# Patient Record
Sex: FEMALE | Race: WHITE | NOT HISPANIC OR LATINO | Employment: FULL TIME | URBAN - METROPOLITAN AREA
[De-identification: names, ages, dates, MRNs, and addresses within clinical notes are randomized per-mention and may not be internally consistent; named-entity substitution may affect disease eponyms.]

---

## 2018-09-20 ENCOUNTER — OFFICE VISIT (OUTPATIENT)
Dept: PSYCHIATRY | Facility: CLINIC | Age: 33
End: 2018-09-20
Attending: PSYCHIATRY & NEUROLOGY
Payer: COMMERCIAL

## 2018-09-20 VITALS
HEIGHT: 62 IN | HEART RATE: 88 BPM | SYSTOLIC BLOOD PRESSURE: 138 MMHG | BODY MASS INDEX: 27.57 KG/M2 | WEIGHT: 149.81 LBS | RESPIRATION RATE: 19 BRPM | DIASTOLIC BLOOD PRESSURE: 84 MMHG

## 2018-09-20 DIAGNOSIS — F90.0 ATTENTION DEFICIT HYPERACTIVITY DISORDER (ADHD), PREDOMINANTLY INATTENTIVE TYPE: ICD-10-CM

## 2018-09-20 DIAGNOSIS — F41.1 GAD (GENERALIZED ANXIETY DISORDER): ICD-10-CM

## 2018-09-20 DIAGNOSIS — F42.2 MIXED OBSESSIONAL THOUGHTS AND ACTS: ICD-10-CM

## 2018-09-20 DIAGNOSIS — F33.42 RECURRENT MAJOR DEPRESSIVE DISORDER, IN FULL REMISSION: Primary | ICD-10-CM

## 2018-09-20 PROCEDURE — 99999 PR PBB SHADOW E&M-NEW PATIENT-LVL III: CPT | Mod: PBBFAC,,, | Performed by: PSYCHIATRY & NEUROLOGY

## 2018-09-20 PROCEDURE — 90792 PSYCH DIAG EVAL W/MED SRVCS: CPT | Mod: S$GLB,,, | Performed by: PSYCHIATRY & NEUROLOGY

## 2018-09-20 RX ORDER — CLONAZEPAM 0.5 MG/1
0.5 TABLET ORAL DAILY
Refills: 5 | COMMUNITY
Start: 2018-08-27 | End: 2018-12-17 | Stop reason: SDUPTHER

## 2018-09-20 RX ORDER — PHENTERMINE HYDROCHLORIDE 37.5 MG/1
37.5 TABLET ORAL
COMMUNITY
End: 2018-09-20 | Stop reason: SDUPTHER

## 2018-09-20 RX ORDER — HYDROCHLOROTHIAZIDE 12.5 MG/1
12.5 CAPSULE ORAL DAILY
Refills: 8 | COMMUNITY
Start: 2018-08-27

## 2018-09-20 RX ORDER — FLUOXETINE HYDROCHLORIDE 20 MG/1
60 CAPSULE ORAL DAILY
Refills: 5 | COMMUNITY
Start: 2018-09-10 | End: 2018-12-17 | Stop reason: SDUPTHER

## 2018-09-20 RX ORDER — PHENTERMINE HYDROCHLORIDE 37.5 MG/1
37.5 TABLET ORAL
Qty: 15 TABLET | Refills: 0 | Status: SHIPPED | OUTPATIENT
Start: 2018-09-20 | End: 2018-10-18

## 2018-09-20 NOTE — PROGRESS NOTES
"Outpatient Psychiatry Initial Visit (MD/NP)    9/20/2018    Johann Hendrix, a 32 y.o. female, presenting for initial evaluation visit. Met with patient.    Reason for Encounter: self-referral. Patient complains of No chief complaint on file.  .    History of Present Illness:     She first got psychiatric care from Sara Price five years ago for OCD anxiety and depression.  She was put on the keto diet by Dr. Price about a year ago.  She has lost over 50 pounds since starting the diet, she is lessening up on the diet.    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  Her  works as a     Denies Symptoms of Depression: diminished mood or loss of interest/anhedonia; irritability, diminished energy, change in sleep, change in appetite, diminished concentration or cognition or indecisiveness, PMA/R, excessive guilt or hopelessness or worthlessness, suicidal ideations    She has had previous episodes of depression.  She has suicidal thoughts at those times.  She has not had a depressive episode since she got on her keto diet.      Denies Changes in Sleep: trouble with initiation, maintenance, early morning awakening with inability to return to sleep, hypersomnolence     She takes 50mg of benadryl with her clonazepam for sleep.      Denies Suicidal/Homicidal ideations: active/passive ideations, organized plans, future intentions    Possible history of Symptoms of burke or hypomania: elevated, expansive, or irritable mood with increased energy or activity; with inflated self-esteem or grandiosity, decreased need for sleep, increased rate of speech, FOI or racing thoughts, distractibility, increased goal " directed activity or PMA, risky/disinhibited behavior    Five years ago she had an episode where, on and off for a year, she would have decreased sleep, racing thoughts, a lot of drama.  She would go a few days without sleep.  Patient explains that she had been initially diagnosed with bipolar disorder, but Dr. Price eventually thought that it was OCD and anxiety / depression.  That's why she stopped lithium etc.  She now believes it was a severe depression.      Endorses Symptoms of AMELIA: +excessive anxiety/worry/fear, +more days than not, +about numerous issues, +difficult to control, +with restlessness, fatigue, poor concentration, +irritability, +muscle tension, +sleep disturbance; +causes functionally impairing distress     Symptoms of Panic Disorder: recurrent panic attacks (palpitations/heart racing, sweating, shakiness, dyspnea, choking, chest pain/discomfort, Gi symptoms, dizzy/lightheadedness, hot/col flashes, paresthesias, derealization, fear of losing control or fear of dying), precipitated or un-precipitated, source of worry and/or behavioral changes secondary, with or without agoraphobia    ADHD Screen, denies hyperactivity  Trouble paying close attention to details, or careless mistakes: reports  difficulty sustaining attention/remaining focused: reports  Absent minded/wandeing thoughts during conversation: admits to  Doesn't follow through on instructions, starts tasks but does not finish or easily distracted: does not  Difficulty with organizing: denies  Avoids/dislikes tasks that require sustained attention: denies  Looses important things: admits to  Easily distracted by extraneous stimuli: admits to  Forgetful in daily activities: admits to    No history of a heart problem, she has high blood pressure but is treated with HCTZ.  No family history of heart attack at a young age.      Some Symptoms of Social Anxiety Disorder: excessive fear/anxiety regarding social situations with fear of being  negatively evaluated, avoidaant behavior, functionally impairing distress    Denies Symptoms of PTSD: h/o trauma; re-experiencing/intrusive symptoms, avoidant behavior, negative alterations in cognition or mood, hyperarousal symptoms; with or without dissociative symptoms     Resolved Symptoms of OCD: obsessions (recurrent thoughts/urges/images; intrusive and/or unwanted; uses other thoughts/actions to suppress); compulsions (repetitive behaviors used to lower distress/anxiety/obsessions)     After hearing about someone killing a child a couple years ago, she had really worried about whether she could hurt her children.  She has gotten therapy for this and she has been able to let it go.      Denies Substance Use: intoxication, withdrawal, tolerance, used in larger amounts or duration than intended, unsuccessful attempts to limit or quit, increased time engaging in or seeking out, cravings or strong desire to use, failure to fulfill obligations, negative consequences in social/interpersonal/occupational,/recreational areas, use in dangerous situations, medical or psychological consequences       Review Of Systems:     GENERAL:  No weight gain or loss  SKIN:  No rashes or lacerations  HEAD:  No headaches  EYES:  No exophthalmos, jaundice or blindness  EARS:  No dizziness, tinnitus or hearing loss  NOSE:  No changes in smell  MOUTH & THROAT:  No dyskinetic movements or obvious goiter  CHEST:  No shortness of breath, hyperventilation or cough  CARDIOVASCULAR:  No tachycardia or chest pain  ABDOMEN:  No nausea, vomiting, pain, constipation or diarrhea  URINARY:  No frequency, dysuria or sexual dysfunction  ENDOCRINE:  No polydipsia, polyuria  MUSCULOSKELETAL:  No pain or stiffness of the joints  NEUROLOGIC:  No weakness, sensory changes, seizures, confusion, memory loss, tremor or other abnormal movements    Current Evaluation:     Nutritional Screening: Considering the patient's height and weight, medications, medical  "history and preferences, should a referral be made to the dietitian? no    Constitutional  Vitals:  Most recent vital signs, dated less than 90 days prior to this appointment, were reviewed.    Vitals:    09/20/18 1222   BP: 138/84   Pulse: 88   Resp: 19   Weight: 67.9 kg (149 lb 12.8 oz)   Height: 5' 2" (1.575 m)        General:  unremarkable, age appropriate     Musculoskeletal  Muscle Strength/Tone:  no spasicity, no rigidity   Gait & Station:  non-ataxic     Psychiatric  Speech:  no latency; no press   Mood & Affect:  happy, euthymic  congruent and appropriate   Thought Process:  normal and logical   Associations:  intact   Thought Content:  normal, no suicidality, no homicidality, delusions, or paranoia   Insight:  intact   Judgement: behavior is adequate to circumstances   Orientation:  grossly intact   Memory: intact for content of interview   Language: grossly intact   Attention Span & Concentration:  able to focus   Fund of Knowledge:  intact and appropriate to age and level of education       Relevant Elements of Neurological Exam: normal gait    Functioning in Relationships:  Spouse/partner: good  Peers: somewhat isolated  Employers: n/a    Laboratory Data  No visits with results within 1 Month(s) from this visit.   Latest known visit with results is:   Admission on 06/08/2017, Discharged on 06/08/2017   Component Date Value Ref Range Status    POC Preg Test, Ur 06/08/2017 Negative  Negative Final     Acceptable 06/08/2017 Yes   Final         Medications  Outpatient Encounter Medications as of 9/20/2018   Medication Sig Dispense Refill    clonazePAM (KLONOPIN) 0.5 MG tablet Take 0.5 mg by mouth once daily.  5    FLUoxetine (PROZAC) 20 MG capsule Take 60 mg by mouth once daily.  5    hydroCHLOROthiazide (MICROZIDE) 12.5 mg capsule Take 12.5 mg by mouth once daily.  8    phentermine (ADIPEX-P) 37.5 mg tablet Take 1 tablet (37.5 mg total) by mouth before breakfast. TAKE ONE-HALF TABLET " MY MOUTH EVERY MORNING. 15 tablet 0    [DISCONTINUED] phentermine (ADIPEX-P) 37.5 mg tablet Take 37.5 mg by mouth before breakfast. TAKE ONE-HALF TABLET MY MOUTH EVERY MORNING.      [DISCONTINUED] acebutolol (SECTRAL) 400 mg capsule Take 400 mg by mouth 2 (two) times daily.      [DISCONTINUED] clonazePAM (KLONOPIN) 1 MG tablet Take 1 mg by mouth 2 (two) times daily as needed for Anxiety.      [DISCONTINUED] diphenhydrAMINE (SOMINEX) 25 mg tablet Take 25 mg by mouth nightly as needed for Insomnia.      [DISCONTINUED] fluoxetine (PROZAC) 40 MG capsule Take 40 mg by mouth once daily.      [DISCONTINUED] ibuprofen (ADVIL,MOTRIN) 800 MG tablet Take 1 tablet (800 mg total) by mouth every 6 (six) hours as needed for Pain. 20 tablet 0    [DISCONTINUED] lithium (ESKALITH) 300 MG capsule Take 300 mg by mouth 3 (three) times daily with meals.      [DISCONTINUED] loratadine (CLARITIN) 10 mg tablet Take 10 mg by mouth once daily.       No facility-administered encounter medications on file as of 9/20/2018.            Assessment - Diagnosis - Goals:     Impression:       ICD-10-CM ICD-9-CM   1. Recurrent major depressive disorder, in full remission F33.42 296.36   2. Mixed obsessional thoughts and acts F42.2 300.3   3. Attention deficit hyperactivity disorder (ADHD), predominantly inattentive type F90.0 314.00   4. AMELIA (generalized anxiety disorder) F41.1 300.02       Strengths and Liabilities: Strength: Patient accepts guidance/feedback, Strength: Patient has reasonable judgment., Strength: Patient is stable.    Treatment Goals:  Specify outcomes written in observable, behavioral terms:   continued maintenance of conditions    Treatment Plan/Recommendations:     Depression  Continue Prozac 60mg QDay    AMELIA  Prozac  Clonazepam 0.5mg QHS    OCD  Prozac    Weight Loss  Phentermine 37.5mg 1/2 tablet QBreakfast    Return to Clinic: 1 month    Counseling time: 10  Total time: 45  Consulting clinician was informed of the  encounter and consult note.

## 2018-10-18 ENCOUNTER — OFFICE VISIT (OUTPATIENT)
Dept: PSYCHIATRY | Facility: CLINIC | Age: 33
End: 2018-10-18
Attending: PSYCHIATRY & NEUROLOGY
Payer: COMMERCIAL

## 2018-10-18 VITALS
DIASTOLIC BLOOD PRESSURE: 68 MMHG | HEIGHT: 62 IN | WEIGHT: 150.38 LBS | BODY MASS INDEX: 27.67 KG/M2 | HEART RATE: 82 BPM | SYSTOLIC BLOOD PRESSURE: 116 MMHG | RESPIRATION RATE: 18 BRPM

## 2018-10-18 DIAGNOSIS — F33.42 RECURRENT MAJOR DEPRESSIVE DISORDER, IN FULL REMISSION: Primary | ICD-10-CM

## 2018-10-18 DIAGNOSIS — F41.1 GAD (GENERALIZED ANXIETY DISORDER): ICD-10-CM

## 2018-10-18 DIAGNOSIS — F42.2 MIXED OBSESSIONAL THOUGHTS AND ACTS: ICD-10-CM

## 2018-10-18 DIAGNOSIS — F90.0 ATTENTION DEFICIT HYPERACTIVITY DISORDER (ADHD), PREDOMINANTLY INATTENTIVE TYPE: ICD-10-CM

## 2018-10-18 PROCEDURE — 99999 PR PBB SHADOW E&M-EST. PATIENT-LVL III: CPT | Mod: PBBFAC,,, | Performed by: PSYCHIATRY & NEUROLOGY

## 2018-10-18 PROCEDURE — 3008F BODY MASS INDEX DOCD: CPT | Mod: CPTII,S$GLB,, | Performed by: PSYCHIATRY & NEUROLOGY

## 2018-10-18 PROCEDURE — 99213 OFFICE O/P EST LOW 20 MIN: CPT | Mod: S$GLB,,, | Performed by: PSYCHIATRY & NEUROLOGY

## 2018-10-18 RX ORDER — BUPROPION HYDROCHLORIDE 150 MG/1
150 TABLET ORAL DAILY
Qty: 30 TABLET | Refills: 0 | Status: SHIPPED | OUTPATIENT
Start: 2018-10-18 | End: 2018-11-13 | Stop reason: SDUPTHER

## 2018-10-18 RX ORDER — PHENTERMINE HYDROCHLORIDE 37.5 MG/1
37.5 TABLET ORAL
Qty: 8 TABLET | Refills: 0 | Status: SHIPPED | OUTPATIENT
Start: 2018-10-18 | End: 2018-10-26

## 2018-10-18 RX ORDER — IBUPROFEN 800 MG/1
800 TABLET ORAL 3 TIMES DAILY PRN
Refills: 0 | COMMUNITY
Start: 2018-10-01

## 2018-10-18 NOTE — PROGRESS NOTES
"Outpatient Psychiatry Follow-Up Visit (MD/NP)    10/18/2018    Clinical Status of Patient:  Outpatient (Ambulatory)    Chief Complaint:  Johann Hendrix is a 32 y.o. female who presents today for follow-up of depression, anxiety and attention problems.  Met with patient.      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  She has suicidal thoughts at those times.  She has not had a depressive episode since she got on her keto diet.       Denies Changes in Sleep: trouble with initiation, maintenance, early morning awakening with inability to return to sleep, hypersomnolence      She takes 50mg of benadryl with her clonazepam for sleep.       Denies Suicidal/Homicidal ideations: active/passive ideations, organized plans, future intentions     Possible history of Symptoms of burke or hypomania: elevated, expansive, or irritable mood with increased energy or activity; with inflated self-esteem or grandiosity, decreased need for sleep, increased rate of speech, FOI or racing thoughts, distractibility, increased goal directed activity or PMA, risky/disinhibited behavior     Five years ago she had an episode where, on and off for a year, she " "would have decreased sleep, racing thoughts, a lot of drama.  She would go a few days without sleep.  Patient explains that she had been initially diagnosed with bipolar disorder, but Dr. Price eventually thought that it was OCD and anxiety / depression.  That's why she stopped lithium etc.  She now believes it was a severe depression.      Patient with no OCD symptoms      Worsening Symptoms of AMELIA: +excessive anxiety/worry/fear, +more days than not, +about numerous issues, +difficult to control, +with restlessness, fatigue, poor concentration, +irritability, +muscle tension, +sleep disturbance; +causes functionally impairing distress     Patient feels a need to "just keep going".  This started when she started taking pheteramine.  She has lost a lot of weight and does not want to regain weight.      ADHD Screen, denies hyperactivity  Trouble paying close attention to details, or careless mistakes: reports  difficulty sustaining attention/remaining focused: reports  Absent minded/wandeing thoughts during conversation: admits to  Doesn't follow through on instructions, starts tasks but does not finish or easily distracted: does not  Difficulty with organizing: denies  Avoids/dislikes tasks that require sustained attention: denies  Looses important things: admits to  Easily distracted by extraneous stimuli: admits to  Forgetful in daily activities: admits to     No history of a heart problem, she has high blood pressure but is treated with HCTZ.  No family history of heart attack at a young age    Some Symptoms of Social Anxiety Disorder: excessive fear/anxiety regarding social situations with fear of being negatively evaluated, avoidaant behavior, functionally impairing distress    Psychotherapy:  · Target symptoms: anxiety   · Why chosen therapy is appropriate versus another modality: relevant to diagnosis  · Outcome monitoring methods: self-report, observation  · Therapeutic intervention type: behavior modifying " "psychotherapy, supportive psychotherapy  · Topics discussed/themes: building skills sets for symptom management, symptom recognition  · The patient's response to the intervention is accepting. The patient's progress toward treatment goals is good.   · Duration of intervention: 5 minutes.    Review of Systems   · PSYCHIATRIC: Pertinant items are noted in the narrative.  · CONSTITUTIONAL: some weight gain recently, problems with teeth   · MUSCULOSKELETAL: No pain or stiffness of the joints.  · NEUROLOGIC: No weakness, sensory changes, seizures, confusion, memory loss, tremor or other abnormal movements.  · ENDOCRINE: No polydipsia or polyuria.  · RESPIRATORY: No shortness of breath.  · CARDIOVASCULAR: No tachycardia or chest pain.  · GASTROINTESTINAL: No nausea, vomiting, pain, constipation or diarrhea.  · GENITOURINARY: No frequency, dysuria or sexual dysfunction.    Past Medical, Family and Social History: The patient's past medical, family and social history have been reviewed and updated as appropriate within the electronic medical record - see encounter notes.    Compliance: yes    Side effects: see above    Risk Parameters:  Patient reports no suicidal ideation  Patient reports no homicidal ideation  Patient reports no self-injurious behavior  Patient reports no violent behavior    Exam (detailed: at least 9 elements; comprehensive: all 15 elements)   Constitutional  Vitals:  Most recent vital signs, dated less than 90 days prior to this appointment, were reviewed.   Vitals:    10/18/18 1226   BP: 116/68   Pulse: 82   Resp: 18   Weight: 68.2 kg (150 lb 5.7 oz)   Height: 5' 2" (1.575 m)        General:  unremarkable, age appropriate     Musculoskeletal  Muscle Strength/Tone:  no spasicity, no rigidity   Gait & Station:  non-ataxic     Psychiatric  Speech:  no latency; no press   Mood & Affect:  anxious  congruent and appropriate   Thought Process:  normal and logical   Associations:  intact   Thought Content:  " normal, no suicidality, no homicidality, delusions, or paranoia   Insight:  intact   Judgement: behavior is adequate to circumstances   Orientation:  grossly intact   Memory: intact for content of interview   Language: grossly intact   Attention Span & Concentration:  distracted   Fund of Knowledge:  intact and appropriate to age and level of education     Assessment and Diagnosis   Status/Progress: Based on the examination today, the patient's problem(s) is/are worsening.  New problems have not been presented today.   Co-morbidities are not complicating management of the primary condition.  There are no active rule-out diagnoses for this patient at this time.     General Impression:       ICD-10-CM ICD-9-CM   1. Recurrent major depressive disorder, in full remission F33.42 296.36   2. Mixed obsessional thoughts and acts F42.2 300.3   3. Attention deficit hyperactivity disorder (ADHD), predominantly inattentive type F90.0 314.00   4. AMELIA (generalized anxiety disorder) F41.1 300.02       Intervention/Counseling/Treatment Plan     Depression  Continue Prozac 60mg QDay     AMELIA  Prozac  Clonazepam 0.5mg QHS     OCD  Prozac     Weight Loss  Titrate off of phenteramine and start Wellbutrin XL 150mg QDay with plan to increase dose    Discussed diagnosis, risks and benefits of proposed treatment vs alternative treatments vs no treatment, and potential side effects of these treatments. The patient expresses understanding of the above and displays the capacity to agree with this treatment given said understanding. Patient also agrees that, currently, the benefits outweigh the risks and would like to pursue treatment at this time.  Checked LA  and no irregularities were noted.        Return to Clinic: 1 month   65

## 2018-10-18 NOTE — PATIENT INSTRUCTIONS
Please try to reduce your pheteramine to 1/3-1/4 tablet per day while starting wellbutrin.  If you are not very active for a day, don't take your phenteramine.

## 2018-11-13 RX ORDER — BUPROPION HYDROCHLORIDE 150 MG/1
150 TABLET ORAL DAILY
Qty: 30 TABLET | Refills: 0 | Status: SHIPPED | OUTPATIENT
Start: 2018-11-13 | End: 2018-12-17

## 2018-12-17 ENCOUNTER — OFFICE VISIT (OUTPATIENT)
Dept: PSYCHIATRY | Facility: CLINIC | Age: 33
End: 2018-12-17
Attending: PSYCHIATRY & NEUROLOGY
Payer: COMMERCIAL

## 2018-12-17 VITALS
RESPIRATION RATE: 16 BRPM | DIASTOLIC BLOOD PRESSURE: 76 MMHG | SYSTOLIC BLOOD PRESSURE: 122 MMHG | WEIGHT: 152.56 LBS | HEIGHT: 62 IN | HEART RATE: 68 BPM | BODY MASS INDEX: 28.07 KG/M2

## 2018-12-17 DIAGNOSIS — F90.0 ATTENTION DEFICIT HYPERACTIVITY DISORDER (ADHD), PREDOMINANTLY INATTENTIVE TYPE: ICD-10-CM

## 2018-12-17 DIAGNOSIS — F41.1 GAD (GENERALIZED ANXIETY DISORDER): ICD-10-CM

## 2018-12-17 DIAGNOSIS — F33.42 RECURRENT MAJOR DEPRESSIVE DISORDER, IN FULL REMISSION: Primary | ICD-10-CM

## 2018-12-17 DIAGNOSIS — F42.2 MIXED OBSESSIONAL THOUGHTS AND ACTS: ICD-10-CM

## 2018-12-17 PROCEDURE — 90833 PSYTX W PT W E/M 30 MIN: CPT | Mod: S$GLB,,, | Performed by: PSYCHIATRY & NEUROLOGY

## 2018-12-17 PROCEDURE — 99999 PR PBB SHADOW E&M-EST. PATIENT-LVL III: CPT | Mod: PBBFAC,,, | Performed by: PSYCHIATRY & NEUROLOGY

## 2018-12-17 PROCEDURE — 99213 OFFICE O/P EST LOW 20 MIN: CPT | Mod: S$GLB,,, | Performed by: PSYCHIATRY & NEUROLOGY

## 2018-12-17 PROCEDURE — 3008F BODY MASS INDEX DOCD: CPT | Mod: CPTII,S$GLB,, | Performed by: PSYCHIATRY & NEUROLOGY

## 2018-12-17 RX ORDER — DEXTROAMPHETAMINE SACCHARATE, AMPHETAMINE ASPARTATE, DEXTROAMPHETAMINE SULFATE AND AMPHETAMINE SULFATE 2.5; 2.5; 2.5; 2.5 MG/1; MG/1; MG/1; MG/1
TABLET ORAL
Qty: 30 TABLET | Refills: 0 | Status: SHIPPED | OUTPATIENT
Start: 2018-12-17 | End: 2019-01-17 | Stop reason: SDUPTHER

## 2018-12-17 RX ORDER — CLONAZEPAM 0.5 MG/1
0.5 TABLET ORAL DAILY
Qty: 30 TABLET | Refills: 0 | Status: SHIPPED | OUTPATIENT
Start: 2018-12-17 | End: 2019-01-17

## 2018-12-17 RX ORDER — FLUOXETINE HYDROCHLORIDE 40 MG/1
80 CAPSULE ORAL DAILY
Qty: 60 CAPSULE | Refills: 0 | Status: SHIPPED | OUTPATIENT
Start: 2018-12-17 | End: 2019-01-17 | Stop reason: SDUPTHER

## 2018-12-17 NOTE — PROGRESS NOTES
"Outpatient Psychiatry Follow-Up Visit (MD/NP)    12/17/2018    Clinical Status of Patient:  Outpatient (Ambulatory)    Chief Complaint:  Johann Hendrix is a 33 y.o. female who presents today for follow-up of depression, anxiety and attention problems.  Met with patient.      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  Her  works as a     12/17/18  She feels like the wellbutrin is not effective.  She feels groggy as if it is not working.  She doesn't have motivation like she used to on phenteramine.  We discussed possibly trying higher dose of wellbutrin or low dose adderall.  She is concerned that stimulants worsen her anxiety.  She is much less anxious without phenteramine.      Denies Symptoms of Depression: diminished mood or loss of interest/anhedonia; irritability, diminished energy, change in sleep, change in appetite, diminished concentration or cognition or indecisiveness, PMA/R, excessive guilt or hopelessness or worthlessness, suicidal ideations     She has had previous episodes of depression.  She has suicidal thoughts at those times.  She has not had a depressive episode since she got on her keto diet.       Denies Changes in Sleep: trouble with initiation, maintenance, early morning awakening with inability to return to sleep, hypersomnolence      She takes 50mg of benadryl with her clonazepam for sleep.       Denies Suicidal/Homicidal ideations: active/passive ideations, organized plans, future intentions     Possible history of Symptoms of burke or hypomania: elevated, expansive, or irritable " mood with increased energy or activity; with inflated self-esteem or grandiosity, decreased need for sleep, increased rate of speech, FOI or racing thoughts, distractibility, increased goal directed activity or PMA, risky/disinhibited behavior     Five years ago she had an episode where, on and off for a year, she would have decreased sleep, racing thoughts, a lot of drama.  She would go a few days without sleep.  Patient explains that she had been initially diagnosed with bipolar disorder, but Dr. Price eventually thought that it was OCD and anxiety / depression.  That's why she stopped lithium etc.  She now believes it was a severe depression.      Patient with no OCD symptoms      Somewhat improving Symptoms of AMELIA: less excessive anxiety/worry/fear, +more days than not, +about numerous issues, +difficult to control, +with restlessness, fatigue, poor concentration, +irritability, +muscle tension, +sleep disturbance; improved causes functionally impairing distress     She has less anxiety now that she isn't on phenteramine.      Worsening ADHD Screen, denies hyperactivity  All of the following: Trouble paying close attention to details, or careless mistakes: reports  difficulty sustaining attention/remaining focused: reports  Absent minded/wandeing thoughts during conversation: admits to  Doesn't follow through on instructions, starts tasks but does not finish or easily distracted: does not  Difficulty with organizing: denies  Avoids/dislikes tasks that require sustained attention: denies  Looses important things: admits to  Easily distracted by extraneous stimuli: admits to  Forgetful in daily activities: admits to     No history of a heart problem, she has high blood pressure but is treated with HCTZ.  No family history of heart attack at a young age    Some Symptoms of Social Anxiety Disorder: excessive fear/anxiety regarding social situations with fear of being negatively evaluated, avoidaant behavior,  "functionally impairing distress    Psychotherapy:  · Target symptoms: anxiety   · Why chosen therapy is appropriate versus another modality: relevant to diagnosis  · Outcome monitoring methods: self-report, observation  · Therapeutic intervention type: behavior modifying psychotherapy, supportive psychotherapy  · Topics discussed/themes: building skills sets for symptom management, symptom recognition  · The patient's response to the intervention is accepting. The patient's progress toward treatment goals is good.   · Duration of intervention: 16 minutes.  Discussed lifestyle methods for treating motivation and ADHD  Review of Systems   · PSYCHIATRIC: Pertinant items are noted in the narrative.  · CONSTITUTIONAL: some weight gain recently, problems with teeth   · MUSCULOSKELETAL: No pain or stiffness of the joints.  · NEUROLOGIC: No weakness, sensory changes, seizures, confusion, memory loss, tremor or other abnormal movements.  · ENDOCRINE: No polydipsia or polyuria.  · RESPIRATORY: No shortness of breath.  · CARDIOVASCULAR: No tachycardia or chest pain.  · GASTROINTESTINAL: No nausea, vomiting, pain, constipation or diarrhea.  · GENITOURINARY: No frequency, dysuria or sexual dysfunction.    Past Medical, Family and Social History: The patient's past medical, family and social history have been reviewed and updated as appropriate within the electronic medical record - see encounter notes.    Compliance: yes    Side effects: see above    Risk Parameters:  Patient reports no suicidal ideation  Patient reports no homicidal ideation  Patient reports no self-injurious behavior  Patient reports no violent behavior    Exam (detailed: at least 9 elements; comprehensive: all 15 elements)   Constitutional  Vitals:  Most recent vital signs, dated less than 90 days prior to this appointment, were reviewed.   Vitals:    12/17/18 1211   BP: 122/76   Pulse: 68   Resp: 16   Weight: 69.2 kg (152 lb 8.9 oz)   Height: 5' 2" (1.575 m) "        General:  unremarkable, age appropriate     Musculoskeletal  Muscle Strength/Tone:  no spasicity, no rigidity   Gait & Station:  non-ataxic     Psychiatric  Speech:  no latency; no press   Mood & Affect:  happy, euthymic  congruent and appropriate   Thought Process:  normal and logical   Associations:  intact   Thought Content:  normal, no suicidality, no homicidality, delusions, or paranoia   Insight:  intact   Judgement: behavior is adequate to circumstances   Orientation:  grossly intact   Memory: intact for content of interview   Language: grossly intact   Attention Span & Concentration:  distracted   Fund of Knowledge:  intact and appropriate to age and level of education     Assessment and Diagnosis   Status/Progress: Based on the examination today, the patient's problem(s) is/are inadequately controlled.  New problems have not been presented today.   Co-morbidities are not complicating management of the primary condition.  There are no active rule-out diagnoses for this patient at this time.     General Impression:       ICD-10-CM ICD-9-CM   1. Recurrent major depressive disorder, in full remission F33.42 296.36   2. Mixed obsessional thoughts and acts F42.2 300.3   3. Attention deficit hyperactivity disorder (ADHD), predominantly inattentive type F90.0 314.00   4. AMELIA (generalized anxiety disorder) F41.1 300.02       Intervention/Counseling/Treatment Plan     Depression  IncreaseProzac 80mg QDay     AMELIA  Prozac  Clonazepam 0.5mg QHS     OCD  Prozac     ADHD  Start Adderall 5mg BID    Weight Loss  Adderall should help similar to phenteramine     Discussed diagnosis, risks and benefits of proposed treatment vs alternative treatments vs no treatment, and potential side effects of these treatments. The patient expresses understanding of the above and displays the capacity to agree with this treatment given said understanding. Patient also agrees that, currently, the benefits outweigh the risks and would  like to pursue treatment at this time.  Checked [place]  and no irregularities were noted.        Return to Clinic: 1 month

## 2019-01-17 ENCOUNTER — OFFICE VISIT (OUTPATIENT)
Dept: PSYCHIATRY | Facility: CLINIC | Age: 34
End: 2019-01-17
Attending: PSYCHIATRY & NEUROLOGY
Payer: COMMERCIAL

## 2019-01-17 VITALS
HEART RATE: 86 BPM | RESPIRATION RATE: 19 BRPM | DIASTOLIC BLOOD PRESSURE: 84 MMHG | WEIGHT: 156.94 LBS | BODY MASS INDEX: 28.88 KG/M2 | SYSTOLIC BLOOD PRESSURE: 132 MMHG | HEIGHT: 62 IN

## 2019-01-17 DIAGNOSIS — F33.42 RECURRENT MAJOR DEPRESSIVE DISORDER, IN FULL REMISSION: Primary | ICD-10-CM

## 2019-01-17 DIAGNOSIS — F41.1 GAD (GENERALIZED ANXIETY DISORDER): ICD-10-CM

## 2019-01-17 DIAGNOSIS — F90.0 ATTENTION DEFICIT HYPERACTIVITY DISORDER (ADHD), PREDOMINANTLY INATTENTIVE TYPE: ICD-10-CM

## 2019-01-17 PROCEDURE — 90833 PSYTX W PT W E/M 30 MIN: CPT | Mod: S$GLB,,, | Performed by: PSYCHIATRY & NEUROLOGY

## 2019-01-17 PROCEDURE — 99213 OFFICE O/P EST LOW 20 MIN: CPT | Mod: S$GLB,,, | Performed by: PSYCHIATRY & NEUROLOGY

## 2019-01-17 PROCEDURE — 99213 PR OFFICE/OUTPT VISIT, EST, LEVL III, 20-29 MIN: ICD-10-PCS | Mod: S$GLB,,, | Performed by: PSYCHIATRY & NEUROLOGY

## 2019-01-17 PROCEDURE — 90833 PR PSYCHOTHERAPY W/PATIENT W/E&M, 30 MIN (ADD ON): ICD-10-PCS | Mod: S$GLB,,, | Performed by: PSYCHIATRY & NEUROLOGY

## 2019-01-17 PROCEDURE — 99999 PR PBB SHADOW E&M-EST. PATIENT-LVL III: ICD-10-PCS | Mod: PBBFAC,,, | Performed by: PSYCHIATRY & NEUROLOGY

## 2019-01-17 PROCEDURE — 99999 PR PBB SHADOW E&M-EST. PATIENT-LVL III: CPT | Mod: PBBFAC,,, | Performed by: PSYCHIATRY & NEUROLOGY

## 2019-01-17 PROCEDURE — 3008F PR BODY MASS INDEX (BMI) DOCUMENTED: ICD-10-PCS | Mod: CPTII,S$GLB,, | Performed by: PSYCHIATRY & NEUROLOGY

## 2019-01-17 PROCEDURE — 3008F BODY MASS INDEX DOCD: CPT | Mod: CPTII,S$GLB,, | Performed by: PSYCHIATRY & NEUROLOGY

## 2019-01-17 RX ORDER — TRAZODONE HYDROCHLORIDE 100 MG/1
100 TABLET ORAL NIGHTLY
Qty: 30 TABLET | Refills: 0 | Status: SHIPPED | OUTPATIENT
Start: 2019-01-17 | End: 2019-02-16

## 2019-01-17 RX ORDER — FLUOXETINE HYDROCHLORIDE 40 MG/1
80 CAPSULE ORAL DAILY
Qty: 60 CAPSULE | Refills: 0 | Status: SHIPPED | OUTPATIENT
Start: 2019-01-17 | End: 2019-02-14 | Stop reason: SDUPTHER

## 2019-01-17 RX ORDER — DEXTROAMPHETAMINE SACCHARATE, AMPHETAMINE ASPARTATE, DEXTROAMPHETAMINE SULFATE AND AMPHETAMINE SULFATE 2.5; 2.5; 2.5; 2.5 MG/1; MG/1; MG/1; MG/1
TABLET ORAL
Qty: 30 TABLET | Refills: 0 | Status: SHIPPED | OUTPATIENT
Start: 2019-01-17 | End: 2019-03-12

## 2019-01-17 NOTE — PROGRESS NOTES
"Outpatient Psychiatry Follow-Up Visit (MD/NP)    1/17/2019    Clinical Status of Patient:  Outpatient (Ambulatory)    Chief Complaint:  Johann Hendrix is a 33 y.o. female who presents today for follow-up of depression, anxiety and attention problems.  Met with patient.      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  Her  works as a     12/17/18  She feels like the wellbutrin is not effective.  She feels groggy as if it is not working.  She doesn't have motivation like she used to on phenteramine.  We discussed possibly trying higher dose of wellbutrin or low dose adderall.  She is concerned that stimulants worsen her anxiety.  She is much less anxious without phenteramine.      1/17/19  She does not feel as motivated as when she was on adipex.  She has also been gaining weight, but has not been dieting.  She used to meal prep and her  is dieting.  She will be attempting again after appointment.  Her anxiety is somewhat worsened by adderall but not completely.  She would like to stop clonazepam.      Denies Symptoms of Depression: diminished mood or loss of interest/anhedonia; irritability, diminished energy, change in sleep, change in appetite, diminished concentration or cognition or indecisiveness, PMA/R, excessive guilt or hopelessness or worthlessness, suicidal ideations     She has had previous episodes of depression.  She has suicidal thoughts at those times.  She has not had a depressive episode since she got on her keto diet.       Denies Changes in Sleep: trouble with initiation, maintenance, early " morning awakening with inability to return to sleep, hypersomnolence      She takes 50mg of benadryl with her clonazepam for sleep.       Denies Suicidal/Homicidal ideations: active/passive ideations, organized plans, future intentions     Possible history of Symptoms of burke or hypomania: elevated, expansive, or irritable mood with increased energy or activity; with inflated self-esteem or grandiosity, decreased need for sleep, increased rate of speech, FOI or racing thoughts, distractibility, increased goal directed activity or PMA, risky/disinhibited behavior     Five years ago she had an episode where, on and off for a year, she would have decreased sleep, racing thoughts, a lot of drama.  She would go a few days without sleep.  Patient explains that she had been initially diagnosed with bipolar disorder, but Dr. Price eventually thought that it was OCD and anxiety / depression.  That's why she stopped lithium etc.  She now believes it was a severe depression.      Patient with no OCD symptoms but has impulses related to food    Varied Symptoms of AMELIA: less excessive anxiety/worry/fear, +more days than not, +about numerous issues, +difficult to control, +with restlessness, fatigue, poor concentration, +irritability, +muscle tension, +sleep disturbance; improved causes functionally impairing distress     She has less anxiety now that she isn't on phenteramine.      Improving ADHD Screen, denies hyperactivity  Less of all of the following: Trouble paying close attention to details, or careless mistakes: reports  difficulty sustaining attention/remaining focused: reports  Absent minded/wandeing thoughts during conversation: admits to  Doesn't follow through on instructions, starts tasks but does not finish or easily distracted: does not  Difficulty with organizing: denies  Avoids/dislikes tasks that require sustained attention: denies  Looses important things: admits to  Easily distracted by extraneous stimuli:  admits to  Forgetful in daily activities: admits to     No history of a heart problem, she has high blood pressure but is treated with HCTZ.  No family history of heart attack at a young age    Some Symptoms of Social Anxiety Disorder: excessive fear/anxiety regarding social situations with fear of being negatively evaluated, avoidaant behavior, functionally impairing distress    Psychotherapy:  · Target symptoms: anxiety   · Why chosen therapy is appropriate versus another modality: relevant to diagnosis  · Outcome monitoring methods: self-report, observation  · Therapeutic intervention type: behavior modifying psychotherapy, supportive psychotherapy  · Topics discussed/themes: building skills sets for symptom management, symptom recognition  · The patient's response to the intervention is accepting. The patient's progress toward treatment goals is good.   · Duration of intervention: 16 minutes.  Discussed lifestyle methods for treating motivation and ADHD.  Discussed courage for virtue  Review of Systems   · PSYCHIATRIC: Pertinant items are noted in the narrative.  · CONSTITUTIONAL: weight gain   · MUSCULOSKELETAL: No pain or stiffness of the joints.  · NEUROLOGIC: No weakness, sensory changes, seizures, confusion, memory loss, tremor or other abnormal movements.  · ENDOCRINE: No polydipsia or polyuria.  · RESPIRATORY: No shortness of breath.  · CARDIOVASCULAR: No tachycardia or chest pain.  · GASTROINTESTINAL: No nausea, vomiting, pain, constipation or diarrhea.  · GENITOURINARY: No frequency, dysuria or sexual dysfunction.    Past Medical, Family and Social History: The patient's past medical, family and social history have been reviewed and updated as appropriate within the electronic medical record - see encounter notes.    Compliance: yes    Side effects: see above    Risk Parameters:  Patient reports no suicidal ideation  Patient reports no homicidal ideation  Patient reports no self-injurious  "behavior  Patient reports no violent behavior    Exam (detailed: at least 9 elements; comprehensive: all 15 elements)   Constitutional  Vitals:  Most recent vital signs, dated less than 90 days prior to this appointment, were reviewed.   Vitals:    01/17/19 1034   BP: 132/84   Pulse: 86   Resp: 19   Weight: 71.2 kg (156 lb 15.5 oz)   Height: 5' 2" (1.575 m)        General:  unremarkable, age appropriate     Musculoskeletal  Muscle Strength/Tone:  no spasicity, no rigidity   Gait & Station:  non-ataxic     Psychiatric  Speech:  no latency; no press   Mood & Affect:  happy, anxious  congruent and appropriate   Thought Process:  normal and logical   Associations:  intact   Thought Content:  normal, no suicidality, no homicidality, delusions, or paranoia   Insight:  intact   Judgement: behavior is adequate to circumstances   Orientation:  grossly intact   Memory: intact for content of interview   Language: grossly intact   Attention Span & Concentration:  distracted   Fund of Knowledge:  intact and appropriate to age and level of education     Assessment and Diagnosis   Status/Progress: Based on the examination today, the patient's problem(s) is/are adequately but not ideally controlled.  New problems have not been presented today.   Co-morbidities are not complicating management of the primary condition.  There are no active rule-out diagnoses for this patient at this time.     General Impression:       ICD-10-CM ICD-9-CM   1. Recurrent major depressive disorder, in full remission F33.42 296.36   2. Attention deficit hyperactivity disorder (ADHD), predominantly inattentive type F90.0 314.00   3. AMELIA (generalized anxiety disorder) F41.1 300.02       Intervention/Counseling/Treatment Plan     Depression  Continue Prozac 80mg QDay     AMELIA  Prozac  Stop Clonazepam     Patient endorses diagnosis of OCD  Prozac     ADHD  Continue Adderall 5mg BID    Weight Loss  Adderall should help similar to phenteramine     Discussed " diagnosis, risks and benefits of proposed treatment vs alternative treatments vs no treatment, and potential side effects of these treatments. The patient expresses understanding of the above and displays the capacity to agree with this treatment given said understanding. Patient also agrees that, currently, the benefits outweigh the risks and would like to pursue treatment at this time.  Checked [place]  and no irregularities were noted.        Return to Clinic: 1 month

## 2019-02-14 ENCOUNTER — OFFICE VISIT (OUTPATIENT)
Dept: PSYCHIATRY | Facility: CLINIC | Age: 34
End: 2019-02-14
Attending: PSYCHIATRY & NEUROLOGY
Payer: COMMERCIAL

## 2019-02-14 VITALS
HEART RATE: 82 BPM | HEIGHT: 62 IN | WEIGHT: 155.63 LBS | SYSTOLIC BLOOD PRESSURE: 130 MMHG | RESPIRATION RATE: 19 BRPM | DIASTOLIC BLOOD PRESSURE: 84 MMHG | BODY MASS INDEX: 28.64 KG/M2

## 2019-02-14 DIAGNOSIS — F33.42 RECURRENT MAJOR DEPRESSIVE DISORDER, IN FULL REMISSION: Primary | ICD-10-CM

## 2019-02-14 DIAGNOSIS — F90.0 ATTENTION DEFICIT HYPERACTIVITY DISORDER (ADHD), PREDOMINANTLY INATTENTIVE TYPE: ICD-10-CM

## 2019-02-14 DIAGNOSIS — F41.1 GAD (GENERALIZED ANXIETY DISORDER): ICD-10-CM

## 2019-02-14 PROCEDURE — 99213 PR OFFICE/OUTPT VISIT, EST, LEVL III, 20-29 MIN: ICD-10-PCS | Mod: S$GLB,,, | Performed by: PSYCHIATRY & NEUROLOGY

## 2019-02-14 PROCEDURE — 99999 PR PBB SHADOW E&M-EST. PATIENT-LVL III: CPT | Mod: PBBFAC,,, | Performed by: PSYCHIATRY & NEUROLOGY

## 2019-02-14 PROCEDURE — 3008F PR BODY MASS INDEX (BMI) DOCUMENTED: ICD-10-PCS | Mod: CPTII,S$GLB,, | Performed by: PSYCHIATRY & NEUROLOGY

## 2019-02-14 PROCEDURE — 3008F BODY MASS INDEX DOCD: CPT | Mod: CPTII,S$GLB,, | Performed by: PSYCHIATRY & NEUROLOGY

## 2019-02-14 PROCEDURE — 90833 PR PSYCHOTHERAPY W/PATIENT W/E&M, 30 MIN (ADD ON): ICD-10-PCS | Mod: S$GLB,,, | Performed by: PSYCHIATRY & NEUROLOGY

## 2019-02-14 PROCEDURE — 99999 PR PBB SHADOW E&M-EST. PATIENT-LVL III: ICD-10-PCS | Mod: PBBFAC,,, | Performed by: PSYCHIATRY & NEUROLOGY

## 2019-02-14 PROCEDURE — 90833 PSYTX W PT W E/M 30 MIN: CPT | Mod: S$GLB,,, | Performed by: PSYCHIATRY & NEUROLOGY

## 2019-02-14 PROCEDURE — 99213 OFFICE O/P EST LOW 20 MIN: CPT | Mod: S$GLB,,, | Performed by: PSYCHIATRY & NEUROLOGY

## 2019-02-14 RX ORDER — TRAZODONE HYDROCHLORIDE 100 MG/1
200 TABLET ORAL NIGHTLY
Qty: 60 TABLET | Refills: 0 | Status: SHIPPED | OUTPATIENT
Start: 2019-02-14 | End: 2019-03-12 | Stop reason: SDUPTHER

## 2019-02-14 RX ORDER — TRAZODONE HYDROCHLORIDE 100 MG/1
100 TABLET ORAL NIGHTLY
Qty: 30 TABLET | Refills: 0 | Status: SHIPPED | OUTPATIENT
Start: 2019-02-14 | End: 2019-02-14 | Stop reason: SDUPTHER

## 2019-02-14 RX ORDER — BUPROPION HYDROCHLORIDE 150 MG/1
150 TABLET ORAL DAILY
Qty: 30 TABLET | Refills: 0 | Status: SHIPPED | OUTPATIENT
Start: 2019-02-14 | End: 2019-03-12 | Stop reason: SDUPTHER

## 2019-02-14 RX ORDER — FLUOXETINE HYDROCHLORIDE 40 MG/1
80 CAPSULE ORAL DAILY
Qty: 180 CAPSULE | Refills: 0 | Status: SHIPPED | OUTPATIENT
Start: 2019-02-14 | End: 2019-05-01 | Stop reason: SDUPTHER

## 2019-02-14 NOTE — PROGRESS NOTES
"Outpatient Psychiatry Follow-Up Visit (MD/NP)    2/14/2019    Clinical Status of Patient:  Outpatient (Ambulatory)    Chief Complaint:  Johann Hendrix is a 33 y.o. female who presents today for follow-up of depression, anxiety and attention problems.  Met with patient.      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  Her  works as a     12/17/18  She feels like the wellbutrin is not effective.  She feels groggy as if it is not working.  She doesn't have motivation like she used to on phenteramine.  We discussed possibly trying higher dose of wellbutrin or low dose adderall.  She is concerned that stimulants worsen her anxiety.  She is much less anxious without phenteramine.      1/17/19  She does not feel as motivated as when she was on adipex.  She has also been gaining weight, but has not been dieting.  She used to meal prep and her  is dieting.  She will be attempting again after appointment.  Her anxiety is somewhat worsened by adderall but not completely.  She would like to stop clonazepam.      2/14/19  Patient is very anxious and irritable.  She finds that adderall is worse for her than the phentermine despite that dose being much higher.  She finds that she cannot slow down.  She is worried about gaining weight, she has gained 10 pounds since being taken off of phentermine.  She is no longer on clonazepam.      Denies Symptoms of Depression: diminished mood or loss of interest/anhedonia; irritability, diminished energy, change in sleep, change in appetite, diminished concentration " "or cognition or indecisiveness, PMA/R, excessive guilt or hopelessness or worthlessness, suicidal ideations     "I'm actually content in that area"     Denies Changes in Sleep: trouble with initiation, maintenance, early morning awakening with inability to return to sleep, hypersomnolence      She has some trouble with initiation but then sleeps well for 7-8 hours.       Denies Suicidal/Homicidal ideations: active/passive ideations, organized plans, future intentions     Possible history of Symptoms of burke or hypomania: elevated, expansive, or irritable mood with increased energy or activity; with inflated self-esteem or grandiosity, decreased need for sleep, increased rate of speech, FOI or racing thoughts, distractibility, increased goal directed activity or PMA, risky/disinhibited behavior     Five years ago she had an episode where, on and off for a year, she would have decreased sleep, racing thoughts, a lot of drama.  She would go a few days without sleep.  Patient explains that she had been initially diagnosed with bipolar disorder, but Dr. Price eventually thought that it was OCD and anxiety / depression.  That's why she stopped lithium etc.  She now believes it was a severe depression.      Patient with no OCD symptoms but has impulses related to food    Worsening Symptoms of AMELIA: + excessive anxiety/worry/fear, +more days than not, +about numerous issues, +difficult to control, +with restlessness, fatigue, poor concentration, +irritability, +muscle tension, +sleep disturbance; improved causes functionally impairing distress     She has less anxiety now that she isn't on phenteramine.      VariedADHD Screen, denies hyperactivity  Varied of all of the following: Trouble paying close attention to details, or careless mistakes: reports  difficulty sustaining attention/remaining focused: reports  Absent minded/wandeing thoughts during conversation: admits to  Doesn't follow through on instructions, starts " tasks but does not finish or easily distracted: does not  Difficulty with organizing: denies  Avoids/dislikes tasks that require sustained attention: denies  Looses important things: admits to  Easily distracted by extraneous stimuli: admits to  Forgetful in daily activities: admits to     No history of a heart problem, she has high blood pressure but is treated with HCTZ.  No family history of heart attack at a young age    Some Symptoms of Social Anxiety Disorder: excessive fear/anxiety regarding social situations with fear of being negatively evaluated, avoidaant behavior, functionally impairing distress    Psychotherapy:  · Target symptoms: anxiety   · Why chosen therapy is appropriate versus another modality: relevant to diagnosis  · Outcome monitoring methods: self-report, observation  · Therapeutic intervention type: behavior modifying psychotherapy, supportive psychotherapy  · Topics discussed/themes: building skills sets for symptom management, symptom recognition  · The patient's response to the intervention is accepting. The patient's progress toward treatment goals is good.   · Duration of intervention: 16 minutes.  Discussed diet exercise habits that would improve her anxiety and weight.  She has ran in the past, encouraged her to restart    Review of Systems   · PSYCHIATRIC: Pertinant items are noted in the narrative.  · CONSTITUTIONAL: weight gain   · MUSCULOSKELETAL: No pain or stiffness of the joints.  · NEUROLOGIC: No weakness, sensory changes, seizures, confusion, memory loss, tremor or other abnormal movements.  · ENDOCRINE: No polydipsia or polyuria.  · RESPIRATORY: No shortness of breath.  · CARDIOVASCULAR: No tachycardia or chest pain.  · GASTROINTESTINAL: No nausea, vomiting, pain, constipation or diarrhea.  · GENITOURINARY: No frequency, dysuria or sexual dysfunction.    Past Medical, Family and Social History: The patient's past medical, family and social history have been reviewed and  "updated as appropriate within the electronic medical record - see encounter notes.    Compliance: yes    Side effects: see above    Risk Parameters:  Patient reports no suicidal ideation  Patient reports no homicidal ideation  Patient reports no self-injurious behavior  Patient reports no violent behavior    Exam (detailed: at least 9 elements; comprehensive: all 15 elements)   Constitutional  Vitals:  Most recent vital signs, dated less than 90 days prior to this appointment, were reviewed.   Vitals:    02/14/19 1052   BP: 130/84   Pulse: 82   Resp: 19   Weight: 70.6 kg (155 lb 10.3 oz)   Height: 5' 2" (1.575 m)        General:  unremarkable, age appropriate     Musculoskeletal  Muscle Strength/Tone:  no spasicity, no rigidity   Gait & Station:  non-ataxic     Psychiatric  Speech:  no latency; no press   Mood & Affect:  happy, anxious  congruent and appropriate   Thought Process:  normal and logical   Associations:  intact   Thought Content:  normal, no suicidality, no homicidality, delusions, or paranoia   Insight:  intact   Judgement: behavior is adequate to circumstances   Orientation:  grossly intact   Memory: intact for content of interview   Language: grossly intact   Attention Span & Concentration:  distracted   Fund of Knowledge:  intact and appropriate to age and level of education     Assessment and Diagnosis   Status/Progress: Based on the examination today, the patient's problem(s) is/are adequately but not ideally controlled.  New problems have not been presented today.   Co-morbidities are not complicating management of the primary condition.  There are no active rule-out diagnoses for this patient at this time.     General Impression:       ICD-10-CM ICD-9-CM   1. Recurrent major depressive disorder, in full remission F33.42 296.36   2. Attention deficit hyperactivity disorder (ADHD), predominantly inattentive type F90.0 314.00   3. AMELIA (generalized anxiety disorder) F41.1 300.02 "       Intervention/Counseling/Treatment Plan     Depression  Continue Prozac 80mg QDay     AMELIA  Prozac  Stopped Clonazepam last month     Patient endorses diagnosis of OCD  Prozac     ADHD  Stop Adderall  Start Wellbutrin XL 150mg QDay with possible increase to 300mg in two weeks if tolerating    Weight Loss  Wellbutrin    Discussed diagnosis, risks and benefits of proposed treatment vs alternative treatments vs no treatment, and potential side effects of these treatments. The patient expresses understanding of the above and displays the capacity to agree with this treatment given said understanding. Patient also agrees that, currently, the benefits outweigh the risks and would like to pursue treatment at this time.  Checked [place]  and no irregularities were noted.    Return to Clinic: 1 month

## 2019-02-15 ENCOUNTER — TELEPHONE (OUTPATIENT)
Dept: PSYCHIATRY | Facility: CLINIC | Age: 34
End: 2019-02-15

## 2019-02-15 NOTE — TELEPHONE ENCOUNTER
Patient states she did not realize until last night that she was previously on Wellbutrin 150 mg last October and November and it did not work.       Patient is asking if you have any other suggestions. Please advise.

## 2019-02-15 NOTE — TELEPHONE ENCOUNTER
As per Dr Daniel East, Wellbutrin 150mg was used last time. Patient is to call in two weeks and let us know you are tolerating the medication, we will then send for 300mg.     Patient notified and agreed to beginning Wellbutrin 150 and to increase as needed. Patient voiced understanding.

## 2019-03-12 ENCOUNTER — OFFICE VISIT (OUTPATIENT)
Dept: PSYCHIATRY | Facility: CLINIC | Age: 34
End: 2019-03-12
Attending: PSYCHIATRY & NEUROLOGY
Payer: COMMERCIAL

## 2019-03-12 VITALS
SYSTOLIC BLOOD PRESSURE: 129 MMHG | DIASTOLIC BLOOD PRESSURE: 84 MMHG | BODY MASS INDEX: 28.87 KG/M2 | HEART RATE: 86 BPM | HEIGHT: 62 IN | WEIGHT: 156.88 LBS | RESPIRATION RATE: 19 BRPM

## 2019-03-12 DIAGNOSIS — F33.42 RECURRENT MAJOR DEPRESSIVE DISORDER, IN FULL REMISSION: Primary | ICD-10-CM

## 2019-03-12 DIAGNOSIS — F41.1 GAD (GENERALIZED ANXIETY DISORDER): ICD-10-CM

## 2019-03-12 DIAGNOSIS — F90.0 ATTENTION DEFICIT HYPERACTIVITY DISORDER (ADHD), PREDOMINANTLY INATTENTIVE TYPE: ICD-10-CM

## 2019-03-12 PROCEDURE — 90833 PSYTX W PT W E/M 30 MIN: CPT | Mod: S$GLB,,, | Performed by: PSYCHIATRY & NEUROLOGY

## 2019-03-12 PROCEDURE — 3008F BODY MASS INDEX DOCD: CPT | Mod: CPTII,S$GLB,, | Performed by: PSYCHIATRY & NEUROLOGY

## 2019-03-12 PROCEDURE — 99213 PR OFFICE/OUTPT VISIT, EST, LEVL III, 20-29 MIN: ICD-10-PCS | Mod: S$GLB,,, | Performed by: PSYCHIATRY & NEUROLOGY

## 2019-03-12 PROCEDURE — 90833 PR PSYCHOTHERAPY W/PATIENT W/E&M, 30 MIN (ADD ON): ICD-10-PCS | Mod: S$GLB,,, | Performed by: PSYCHIATRY & NEUROLOGY

## 2019-03-12 PROCEDURE — 99213 OFFICE O/P EST LOW 20 MIN: CPT | Mod: S$GLB,,, | Performed by: PSYCHIATRY & NEUROLOGY

## 2019-03-12 PROCEDURE — 99999 PR PBB SHADOW E&M-EST. PATIENT-LVL III: CPT | Mod: PBBFAC,,, | Performed by: PSYCHIATRY & NEUROLOGY

## 2019-03-12 PROCEDURE — 99999 PR PBB SHADOW E&M-EST. PATIENT-LVL III: ICD-10-PCS | Mod: PBBFAC,,, | Performed by: PSYCHIATRY & NEUROLOGY

## 2019-03-12 PROCEDURE — 3008F PR BODY MASS INDEX (BMI) DOCUMENTED: ICD-10-PCS | Mod: CPTII,S$GLB,, | Performed by: PSYCHIATRY & NEUROLOGY

## 2019-03-12 RX ORDER — TRAZODONE HYDROCHLORIDE 100 MG/1
200 TABLET ORAL NIGHTLY
Qty: 120 TABLET | Refills: 0 | Status: SHIPPED | OUTPATIENT
Start: 2019-03-12 | End: 2019-07-22 | Stop reason: SDUPTHER

## 2019-03-12 RX ORDER — BUPROPION HYDROCHLORIDE 300 MG/1
300 TABLET ORAL DAILY
Qty: 30 TABLET | Refills: 1 | Status: SHIPPED | OUTPATIENT
Start: 2019-03-12 | End: 2019-05-11

## 2019-03-12 NOTE — PROGRESS NOTES
"Outpatient Psychiatry Follow-Up Visit (MD/NP)    3/12/2019    Clinical Status of Patient:  Outpatient (Ambulatory)    Chief Complaint:  Johann Hendrix is a 33 y.o. female who presents today for follow-up of depression, anxiety and attention problems.  Met with patient.      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  Her  works as a     12/17/18  She feels like the wellbutrin is not effective.  She feels groggy as if it is not working.  She doesn't have motivation like she used to on phenteramine.  We discussed possibly trying higher dose of wellbutrin or low dose adderall.  She is concerned that stimulants worsen her anxiety.  She is much less anxious without phenteramine.      1/17/19  She does not feel as motivated as when she was on adipex.  She has also been gaining weight, but has not been dieting.  She used to meal prep and her  is dieting.  She will be attempting again after appointment.  Her anxiety is somewhat worsened by adderall but not completely.  She would like to stop clonazepam.      2/14/19  Patient is very anxious and irritable.  She finds that adderall is worse for her than the phentermine despite that dose being much higher.  She finds that she cannot slow down.  She is worried about gaining weight, she has gained 10 pounds since being taken off of phentermine.  She is no longer on clonazepam.      3/12  She overall feels much better.  She does continue to eat more than she would like.  She explains that her family is usually " "anxious.  Her attention has worsened.  She is taking care of three children as well as has an [employer] business.  We discussed meditation     Denies Symptoms of Depression: diminished mood or loss of interest/anhedonia; irritability, diminished energy, change in sleep, change in appetite, diminished concentration or cognition or indecisiveness, PMA/R, excessive guilt or hopelessness or worthlessness, suicidal ideations     "I'm actually content in that area"     Endorses Changes in Sleep: +trouble with initiation, maintenance, early morning awakening with inability to return to sleep, hypersomnolence      Denies Suicidal/Homicidal ideations: active/passive ideations, organized plans, future intentions     Possible history of Symptoms of burke or hypomania: elevated, expansive, or irritable mood with increased energy or activity; with inflated self-esteem or grandiosity, decreased need for sleep, increased rate of speech, FOI or racing thoughts, distractibility, increased goal directed activity or PMA, risky/disinhibited behavior     Five years ago she had an episode where, on and off for a year, she would have decreased sleep, racing thoughts, a lot of drama.  She would go a few days without sleep.  Patient explains that she had been initially diagnosed with bipolar disorder, but Dr. Price eventually thought that it was OCD and anxiety / depression.  That's why she stopped lithium etc.  She now believes it was a severe depression.      Patient with no OCD symptoms but has impulses related to food    Continued Symptoms of AMELIA: + excessive anxiety/worry/fear, +more days than not, +about numerous issues, +difficult to control, +with restlessness, fatigue, poor concentration, +irritability, +muscle tension, +sleep disturbance; improved causes functionally impairing distress     She has less anxiety now that she isn't on phenteramine.      Continued ADHD Screen, denies hyperactivity  Varied of all of the following: Trouble " paying close attention to details, or careless mistakes: reports  difficulty sustaining attention/remaining focused: reports  Absent minded/wandeing thoughts during conversation: admits to  Doesn't follow through on instructions, starts tasks but does not finish or easily distracted: does not  Difficulty with organizing: denies  Avoids/dislikes tasks that require sustained attention: denies  Looses important things: admits to  Easily distracted by extraneous stimuli: admits to  Forgetful in daily activities: admits to     No history of a heart problem, she has high blood pressure but is treated with HCTZ.  No family history of heart attack at a young age    Some Symptoms of Social Anxiety Disorder: excessive fear/anxiety regarding social situations with fear of being negatively evaluated, avoidaant behavior, functionally impairing distress    Psychotherapy:  · Target symptoms: anxiety   · Why chosen therapy is appropriate versus another modality: relevant to diagnosis  · Outcome monitoring methods: self-report, observation  · Therapeutic intervention type: behavior modifying psychotherapy, supportive psychotherapy  · Topics discussed/themes: building skills sets for symptom management, symptom recognition  · The patient's response to the intervention is accepting. The patient's progress toward treatment goals is good.   · Duration of intervention: 16 minutes.    Discussed mindfulness meditation as a means for helping her sleep    Review of Systems   · PSYCHIATRIC: Pertinant items are noted in the narrative.  · CONSTITUTIONAL: weight gain   · MUSCULOSKELETAL: No pain or stiffness of the joints.  · NEUROLOGIC: No weakness, sensory changes, seizures, confusion, memory loss, tremor or other abnormal movements.  · ENDOCRINE: No polydipsia or polyuria.  · RESPIRATORY: No shortness of breath.  · CARDIOVASCULAR: No tachycardia or chest pain.  · GASTROINTESTINAL: No nausea, vomiting, pain, constipation or  "diarrhea.  · GENITOURINARY: No frequency, dysuria or sexual dysfunction.    Past Medical, Family and Social History: The patient's past medical, family and social history have been reviewed and updated as appropriate within the electronic medical record - see encounter notes.    Compliance: yes    Side effects: None    Risk Parameters:  Patient reports no suicidal ideation  Patient reports no homicidal ideation  Patient reports no self-injurious behavior  Patient reports no violent behavior    Exam (detailed: at least 9 elements; comprehensive: all 15 elements)   Constitutional  Vitals:  Most recent vital signs, dated less than 90 days prior to this appointment, were reviewed.   Vitals:    03/12/19 0951   BP: 129/84   Pulse: 86   Resp: 19   Weight: 71.2 kg (156 lb 13.7 oz)   Height: 5' 2" (1.575 m)        General:  unremarkable, age appropriate     Musculoskeletal  Muscle Strength/Tone:  no spasicity, no rigidity   Gait & Station:  non-ataxic     Psychiatric  Speech:  no latency; no press   Mood & Affect:  happy, anxious  congruent and appropriate   Thought Process:  normal and logical   Associations:  intact   Thought Content:  normal, no suicidality, no homicidality, delusions, or paranoia   Insight:  intact   Judgement: behavior is adequate to circumstances   Orientation:  grossly intact   Memory: intact for content of interview   Language: grossly intact   Attention Span & Concentration:  distracted   Fund of Knowledge:  intact and appropriate to age and level of education     Assessment and Diagnosis   Status/Progress: Based on the examination today, the patient's problem(s) is/are adequately but not ideally controlled.  New problems have not been presented today.   Co-morbidities are not complicating management of the primary condition.  There are no active rule-out diagnoses for this patient at this time.     General Impression:       ICD-10-CM ICD-9-CM   1. Recurrent major depressive disorder, in full " remission F33.42 296.36   2. Attention deficit hyperactivity disorder (ADHD), predominantly inattentive type F90.0 314.00   3. AMELIA (generalized anxiety disorder) F41.1 300.02       Intervention/Counseling/Treatment Plan     Depression  Continue Prozac 80mg QDay  Wellbutrin XL     AMELIA  Prozac  Stopped Clonazepam last month     Patient endorses diagnosis of OCD  Prozac     ADHD  Stop Adderall  Increase Wellbutrin XL 300mg QDay    Weight Loss  Wellbutrin    Discussed diagnosis, risks and benefits of proposed treatment vs alternative treatments vs no treatment, and potential side effects of these treatments. The patient expresses understanding of the above and displays the capacity to agree with this treatment given said understanding. Patient also agrees that, currently, the benefits outweigh the risks and would like to pursue treatment at this time.  Checked [place]  and no irregularities were noted.    Return to Clinic: 2 months

## 2019-05-01 ENCOUNTER — OFFICE VISIT (OUTPATIENT)
Dept: PSYCHIATRY | Facility: CLINIC | Age: 34
End: 2019-05-01
Attending: PSYCHIATRY & NEUROLOGY
Payer: COMMERCIAL

## 2019-05-01 VITALS
BODY MASS INDEX: 28.66 KG/M2 | HEART RATE: 86 BPM | RESPIRATION RATE: 19 BRPM | SYSTOLIC BLOOD PRESSURE: 129 MMHG | WEIGHT: 155.75 LBS | HEIGHT: 62 IN | DIASTOLIC BLOOD PRESSURE: 86 MMHG

## 2019-05-01 DIAGNOSIS — F90.0 ATTENTION DEFICIT HYPERACTIVITY DISORDER (ADHD), PREDOMINANTLY INATTENTIVE TYPE: ICD-10-CM

## 2019-05-01 DIAGNOSIS — E66.3 OVERWEIGHT (BMI 25.0-29.9): ICD-10-CM

## 2019-05-01 DIAGNOSIS — F33.42 RECURRENT MAJOR DEPRESSIVE DISORDER, IN FULL REMISSION: Primary | ICD-10-CM

## 2019-05-01 DIAGNOSIS — F41.1 GAD (GENERALIZED ANXIETY DISORDER): ICD-10-CM

## 2019-05-01 PROCEDURE — 3008F PR BODY MASS INDEX (BMI) DOCUMENTED: ICD-10-PCS | Mod: CPTII,S$GLB,, | Performed by: PSYCHIATRY & NEUROLOGY

## 2019-05-01 PROCEDURE — 99999 PR PBB SHADOW E&M-EST. PATIENT-LVL III: ICD-10-PCS | Mod: PBBFAC,,, | Performed by: PSYCHIATRY & NEUROLOGY

## 2019-05-01 PROCEDURE — 3008F BODY MASS INDEX DOCD: CPT | Mod: CPTII,S$GLB,, | Performed by: PSYCHIATRY & NEUROLOGY

## 2019-05-01 PROCEDURE — 99213 PR OFFICE/OUTPT VISIT, EST, LEVL III, 20-29 MIN: ICD-10-PCS | Mod: S$GLB,,, | Performed by: PSYCHIATRY & NEUROLOGY

## 2019-05-01 PROCEDURE — 99213 OFFICE O/P EST LOW 20 MIN: CPT | Mod: S$GLB,,, | Performed by: PSYCHIATRY & NEUROLOGY

## 2019-05-01 PROCEDURE — 99999 PR PBB SHADOW E&M-EST. PATIENT-LVL III: CPT | Mod: PBBFAC,,, | Performed by: PSYCHIATRY & NEUROLOGY

## 2019-05-01 RX ORDER — FLUOXETINE HYDROCHLORIDE 40 MG/1
80 CAPSULE ORAL DAILY
Qty: 60 CAPSULE | Refills: 0 | Status: SHIPPED | OUTPATIENT
Start: 2019-05-01 | End: 2019-05-30 | Stop reason: SDUPTHER

## 2019-05-01 RX ORDER — BUPROPION HYDROCHLORIDE 150 MG/1
150 TABLET ORAL DAILY
Qty: 30 TABLET | Refills: 0 | Status: SHIPPED | OUTPATIENT
Start: 2019-05-01 | End: 2019-05-30

## 2019-05-01 RX ORDER — PHENTERMINE HYDROCHLORIDE 15 MG/1
15 CAPSULE ORAL EVERY MORNING
Qty: 30 CAPSULE | Refills: 0 | Status: SHIPPED | OUTPATIENT
Start: 2019-05-01 | End: 2019-05-30 | Stop reason: SDUPTHER

## 2019-05-01 NOTE — PROGRESS NOTES
"Outpatient Psychiatry Follow-Up Visit (MD/NP)    5/1/2019    Clinical Status of Patient:  Outpatient (Ambulatory)    Chief Complaint:  Johann Hendrix is a 33 y.o. female who presents today for follow-up of depression, anxiety and attention problems.  Met with patient.      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  Her  works as a     12/17/18  She feels like the wellbutrin is not effective.  She feels groggy as if it is not working.  She doesn't have motivation like she used to on phenteramine.  We discussed possibly trying higher dose of wellbutrin or low dose adderall.  She is concerned that stimulants worsen her anxiety.  She is much less anxious without phenteramine.      1/17/19  She does not feel as motivated as when she was on adipex.  She has also been gaining weight, but has not been dieting.  She used to meal prep and her  is dieting.  She will be attempting again after appointment.  Her anxiety is somewhat worsened by adderall but not completely.  She would like to stop clonazepam.      2/14/19  Patient is very anxious and irritable.  She finds that adderall is worse for her than the phentermine despite that dose being much higher.  She finds that she cannot slow down.  She is worried about gaining weight, she has gained 10 pounds since being taken off of phentermine.  She is no longer on clonazepam.      3/12  She overall feels much better.  She does continue to eat more " "than she would like.  She explains that her family is usually anxious.  Her attention has worsened.  She is taking care of three children as well as has an [employer] business.  We discussed meditation     5/1  Patient feels like she has no motivation.  She felt much better on phentermine versus wellbutrin.  With wellbutrin she feels brain fog and no energy.  Her anxiety is worsened by switching medicine but depression is well controlled.      Denies Symptoms of Depression: diminished mood or loss of interest/anhedonia; irritability, diminished energy, change in sleep, change in appetite, diminished concentration or cognition or indecisiveness, PMA/R, excessive guilt or hopelessness or worthlessness, suicidal ideations     "doing really well here"     Improved Changes in Sleep: less trouble with initiation, maintenance, early morning awakening with inability to return to sleep, hypersomnolence      Denies Suicidal/Homicidal ideations: active/passive ideations, organized plans, future intentions     Possible history of Symptoms of burke or hypomania: elevated, expansive, or irritable mood with increased energy or activity; with inflated self-esteem or grandiosity, decreased need for sleep, increased rate of speech, FOI or racing thoughts, distractibility, increased goal directed activity or PMA, risky/disinhibited behavior     Five years ago she had an episode where, on and off for a year, she would have decreased sleep, racing thoughts, a lot of drama.  She would go a few days without sleep.  Patient explains that she had been initially diagnosed with bipolar disorder, but Dr. Price eventually thought that it was OCD and anxiety / depression.  That's why she stopped lithium etc.  She now believes it was a severe depression.      Patient with no OCD symptoms but has impulses related to food    Continued Symptoms of AMELIA: + excessive anxiety/worry/fear, +more days than not, +about numerous issues, +difficult to control, " +with restlessness, fatigue, poor concentration, +irritability, +muscle tension, +sleep disturbance; improved causes functionally impairing distress     Anxiety worsened by changing medicine    Continued ADHD Screen, denies hyperactivity  Varied of all of the following: Trouble paying close attention to details, or careless mistakes: reports  difficulty sustaining attention/remaining focused: reports  Absent minded/wandeing thoughts during conversation: admits to  Doesn't follow through on instructions, starts tasks but does not finish or easily distracted: does not  Difficulty with organizing: denies  Avoids/dislikes tasks that require sustained attention: denies  Looses important things: admits to  Easily distracted by extraneous stimuli: admits to  Forgetful in daily activities: admits to     No history of a heart problem, she has high blood pressure but is treated with HCTZ.  No family history of heart attack at a young age    Some Symptoms of Social Anxiety Disorder: excessive fear/anxiety regarding social situations with fear of being negatively evaluated, avoidaant behavior, functionally impairing distress    Psychotherapy:  · Target symptoms: anxiety   · Why chosen therapy is appropriate versus another modality: relevant to diagnosis  · Outcome monitoring methods: self-report, observation  · Therapeutic intervention type: behavior modifying psychotherapy, supportive psychotherapy  · Topics discussed/themes: building skills sets for symptom management, symptom recognition  · The patient's response to the intervention is accepting. The patient's progress toward treatment goals is good.   · Duration of intervention: 5 minutes.    Review of Systems   · PSYCHIATRIC: Pertinant items are noted in the narrative.  · CONSTITUTIONAL: weight gain   · MUSCULOSKELETAL: No pain or stiffness of the joints.  · NEUROLOGIC: No weakness, sensory changes, seizures, confusion, memory loss, tremor or other abnormal  "movements.  · ENDOCRINE: No polydipsia or polyuria.  · RESPIRATORY: No shortness of breath.  · CARDIOVASCULAR: No tachycardia or chest pain.  · GASTROINTESTINAL: No nausea, vomiting, pain, constipation or diarrhea.  · GENITOURINARY: No frequency, dysuria or sexual dysfunction.    Past Medical, Family and Social History: The patient's past medical, family and social history have been reviewed and updated as appropriate within the electronic medical record - see encounter notes.    Compliance: yes    Side effects: None    Risk Parameters:  Patient reports no suicidal ideation  Patient reports no homicidal ideation  Patient reports no self-injurious behavior  Patient reports no violent behavior    Exam (detailed: at least 9 elements; comprehensive: all 15 elements)   Constitutional  Vitals:  Most recent vital signs, dated less than 90 days prior to this appointment, were reviewed.   Vitals:    05/01/19 0946   BP: 129/86   Pulse: 86   Resp: 19   Weight: 70.7 kg (155 lb 12.1 oz)   Height: 5' 2" (1.575 m)        General:  unremarkable, age appropriate     Musculoskeletal  Muscle Strength/Tone:  no spasicity, no rigidity   Gait & Station:  non-ataxic     Psychiatric  Speech:  no latency; no press   Mood & Affect:  euthymic, anxious  congruent and appropriate   Thought Process:  normal and logical   Associations:  intact   Thought Content:  normal, no suicidality, no homicidality, delusions, or paranoia   Insight:  intact   Judgement: behavior is adequate to circumstances   Orientation:  grossly intact   Memory: intact for content of interview   Language: grossly intact   Attention Span & Concentration:  distracted   Fund of Knowledge:  intact and appropriate to age and level of education     Assessment and Diagnosis   Status/Progress: Based on the examination today, the patient's problem(s) is/are adequately but not ideally controlled.  New problems have not been presented today.   Co-morbidities are not complicating " management of the primary condition.  There are no active rule-out diagnoses for this patient at this time.     General Impression:       ICD-10-CM ICD-9-CM   1. Recurrent major depressive disorder, in full remission F33.42 296.36   2. Attention deficit hyperactivity disorder (ADHD), predominantly inattentive type F90.0 314.00   3. AMELIA (generalized anxiety disorder) F41.1 300.02   4. Overweight (BMI 25.0-29.9) E66.3 278.02       Intervention/Counseling/Treatment Plan     Depression  Continue Prozac 80mg QDay  Discontinue wellbutrin  Start Adipex 15mg QDay    AMELIA  Prozac  Stopped Clonazepam last month     Patient endorses diagnosis of OCD  Prozac     ADHD  Adipex    Weight Loss  Adipex     Discussed diagnosis, risks and benefits of proposed treatment vs alternative treatments vs no treatment, and potential side effects of these treatments. The patient expresses understanding of the above and displays the capacity to agree with this treatment given said understanding. Patient also agrees that, currently, the benefits outweigh the risks and would like to pursue treatment at this time.  Checked LA  and no irregularities were noted.    Return to Clinic: 1 month

## 2019-05-30 ENCOUNTER — OFFICE VISIT (OUTPATIENT)
Dept: PSYCHIATRY | Facility: CLINIC | Age: 34
End: 2019-05-30
Attending: PSYCHIATRY & NEUROLOGY
Payer: COMMERCIAL

## 2019-05-30 VITALS
WEIGHT: 157.81 LBS | BODY MASS INDEX: 29.04 KG/M2 | HEIGHT: 62 IN | HEART RATE: 73 BPM | RESPIRATION RATE: 17 BRPM | SYSTOLIC BLOOD PRESSURE: 118 MMHG | DIASTOLIC BLOOD PRESSURE: 71 MMHG

## 2019-05-30 DIAGNOSIS — F41.1 GAD (GENERALIZED ANXIETY DISORDER): ICD-10-CM

## 2019-05-30 DIAGNOSIS — F90.0 ATTENTION DEFICIT HYPERACTIVITY DISORDER (ADHD), PREDOMINANTLY INATTENTIVE TYPE: ICD-10-CM

## 2019-05-30 DIAGNOSIS — E66.3 OVERWEIGHT (BMI 25.0-29.9): ICD-10-CM

## 2019-05-30 DIAGNOSIS — F33.42 RECURRENT MAJOR DEPRESSIVE DISORDER, IN FULL REMISSION: Primary | ICD-10-CM

## 2019-05-30 PROCEDURE — 99213 PR OFFICE/OUTPT VISIT, EST, LEVL III, 20-29 MIN: ICD-10-PCS | Mod: S$GLB,,, | Performed by: PSYCHIATRY & NEUROLOGY

## 2019-05-30 PROCEDURE — 90833 PSYTX W PT W E/M 30 MIN: CPT | Mod: S$GLB,,, | Performed by: PSYCHIATRY & NEUROLOGY

## 2019-05-30 PROCEDURE — 99999 PR PBB SHADOW E&M-EST. PATIENT-LVL III: CPT | Mod: PBBFAC,,, | Performed by: PSYCHIATRY & NEUROLOGY

## 2019-05-30 PROCEDURE — 99213 OFFICE O/P EST LOW 20 MIN: CPT | Mod: S$GLB,,, | Performed by: PSYCHIATRY & NEUROLOGY

## 2019-05-30 PROCEDURE — 90833 PR PSYCHOTHERAPY W/PATIENT W/E&M, 30 MIN (ADD ON): ICD-10-PCS | Mod: S$GLB,,, | Performed by: PSYCHIATRY & NEUROLOGY

## 2019-05-30 PROCEDURE — 3008F BODY MASS INDEX DOCD: CPT | Mod: CPTII,S$GLB,, | Performed by: PSYCHIATRY & NEUROLOGY

## 2019-05-30 PROCEDURE — 99999 PR PBB SHADOW E&M-EST. PATIENT-LVL III: ICD-10-PCS | Mod: PBBFAC,,, | Performed by: PSYCHIATRY & NEUROLOGY

## 2019-05-30 PROCEDURE — 3008F PR BODY MASS INDEX (BMI) DOCUMENTED: ICD-10-PCS | Mod: CPTII,S$GLB,, | Performed by: PSYCHIATRY & NEUROLOGY

## 2019-05-30 RX ORDER — PHENTERMINE HYDROCHLORIDE 30 MG/1
30 CAPSULE ORAL EVERY MORNING
Qty: 30 CAPSULE | Refills: 0 | Status: SHIPPED | OUTPATIENT
Start: 2019-06-30 | End: 2019-07-22 | Stop reason: SDUPTHER

## 2019-05-30 RX ORDER — PHENTERMINE HYDROCHLORIDE 30 MG/1
30 CAPSULE ORAL EVERY MORNING
Qty: 30 CAPSULE | Refills: 0 | Status: SHIPPED | OUTPATIENT
Start: 2019-05-30 | End: 2019-05-30 | Stop reason: SDUPTHER

## 2019-05-30 RX ORDER — FLUOXETINE HYDROCHLORIDE 40 MG/1
80 CAPSULE ORAL DAILY
Qty: 60 CAPSULE | Refills: 1 | Status: SHIPPED | OUTPATIENT
Start: 2019-05-30 | End: 2019-07-22 | Stop reason: SDUPTHER

## 2019-05-30 NOTE — PROGRESS NOTES
"Outpatient Psychiatry Follow-Up Visit (MD/NP)    5/30/2019    Clinical Status of Patient:  Outpatient (Ambulatory)    Chief Complaint:  Johann Hendrix is a 33 y.o. female who presents today for follow-up of depression, anxiety and attention problems.  Met with patient.      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  Her  works as a     12/17/18  She feels like the wellbutrin is not effective.  She feels groggy as if it is not working.  She doesn't have motivation like she used to on phenteramine.  We discussed possibly trying higher dose of wellbutrin or low dose adderall.  She is concerned that stimulants worsen her anxiety.  She is much less anxious without phenteramine.      1/17/19  She does not feel as motivated as when she was on adipex.  She has also been gaining weight, but has not been dieting.  She used to meal prep and her  is dieting.  She will be attempting again after appointment.  Her anxiety is somewhat worsened by adderall but not completely.  She would like to stop clonazepam.      2/14/19  Patient is very anxious and irritable.  She finds that adderall is worse for her than the phentermine despite that dose being much higher.  She finds that she cannot slow down.  She is worried about gaining weight, she has gained 10 pounds since being taken off of phentermine.  She is no longer on clonazepam.      3/12  She overall feels much better.  She does continue to eat more " than she would like.  She explains that her family is usually anxious.  Her attention has worsened.  She is taking care of three children as well as has an [employer] business.  We discussed meditation     5/1  Patient feels like she has no motivation.  She felt much better on phentermine versus wellbutrin.  With wellbutrin she feels brain fog and no energy.  Her anxiety is worsened by switching medicine but depression is well controlled.      5/30  Still feels like her energy is low.  She continues to feel anxious but doesn't attribute the anxiety to her phentermine.  She has gained some weight as well.      Denies Symptoms of Depression: diminished mood or loss of interest/anhedonia; irritability, diminished energy, change in sleep, change in appetite, diminished concentration or cognition or indecisiveness, PMA/R, excessive guilt or hopelessness or worthlessness, suicidal ideations     Would have a better mood if wasn't so anxious     Improved Changes in Sleep: less trouble with initiation, maintenance, early morning awakening with inability to return to sleep, hypersomnolence      Denies Suicidal/Homicidal ideations: active/passive ideations, organized plans, future intentions     Possible history of Symptoms of burke or hypomania: elevated, expansive, or irritable mood with increased energy or activity; with inflated self-esteem or grandiosity, decreased need for sleep, increased rate of speech, FOI or racing thoughts, distractibility, increased goal directed activity or PMA, risky/disinhibited behavior     Five years ago she had an episode where, on and off for a year, she would have decreased sleep, racing thoughts, a lot of drama.  She would go a few days without sleep.  Patient explains that she had been initially diagnosed with bipolar disorder, but Dr. Price eventually thought that it was OCD and anxiety / depression.  That's why she stopped lithium etc.  She now believes it was a severe depression.   "    Patient with no OCD symptoms but has impulses related to food    Continued Symptoms of AMELIA: + excessive anxiety/worry/fear, +more days than not, +about numerous issues, +difficult to control, +with restlessness, fatigue, poor concentration, +irritability, +muscle tension, +sleep disturbance; improved causes functionally impairing distress     "I always need to go, all the time"      Continued ADHD Screen, + hyperactivity  Varied of all of the following: Trouble paying close attention to details, or careless mistakes: reports  difficulty sustaining attention/remaining focused: reports  Absent minded/wandeing thoughts during conversation: admits to  Doesn't follow through on instructions, starts tasks but does not finish or easily distracted: does not  Difficulty with organizing: denies  Avoids/dislikes tasks that require sustained attention: denies  Looses important things: admits to  Easily distracted by extraneous stimuli: admits to  Forgetful in daily activities: admits to     No history of a heart problem, she has high blood pressure but is treated with HCTZ.  No family history of heart attack at a young age    Some Symptoms of Social Anxiety Disorder: excessive fear/anxiety regarding social situations with fear of being negatively evaluated, avoidaant behavior, functionally impairing distress    Psychotherapy:  · Target symptoms: anxiety   · Why chosen therapy is appropriate versus another modality: relevant to diagnosis  · Outcome monitoring methods: self-report, observation  · Therapeutic intervention type: behavior modifying psychotherapy, supportive psychotherapy  · Topics discussed/themes: building skills sets for symptom management, symptom recognition  · The patient's response to the intervention is accepting. The patient's progress toward treatment goals is good.   · Duration of intervention: 16 minutes.  Discussed mindfulness meditation  Review of Systems   · PSYCHIATRIC: Pertinant items are noted " "in the narrative.  · CONSTITUTIONAL: weight gain   · MUSCULOSKELETAL: No pain or stiffness of the joints.  · NEUROLOGIC: No weakness, sensory changes, seizures, confusion, memory loss, tremor or other abnormal movements.  · ENDOCRINE: No polydipsia or polyuria.  · RESPIRATORY: No shortness of breath.  · CARDIOVASCULAR: No tachycardia or chest pain.  · GASTROINTESTINAL: No nausea, vomiting, pain, constipation or diarrhea.  · GENITOURINARY: No frequency, dysuria or sexual dysfunction.    Past Medical, Family and Social History: The patient's past medical, family and social history have been reviewed and updated as appropriate within the electronic medical record - see encounter notes.    Compliance: yes    Side effects: None    Risk Parameters:  Patient reports no suicidal ideation  Patient reports no homicidal ideation  Patient reports no self-injurious behavior  Patient reports no violent behavior    Exam (detailed: at least 9 elements; comprehensive: all 15 elements)   Constitutional  Vitals:  Most recent vital signs, dated less than 90 days prior to this appointment, were reviewed.   Vitals:    05/30/19 0948   BP: 118/71   Pulse: 73   Resp: 17   Weight: 71.6 kg (157 lb 12.9 oz)   Height: 5' 2" (1.575 m)        General:  unremarkable, age appropriate     Musculoskeletal  Muscle Strength/Tone:  no spasicity, no rigidity   Gait & Station:  non-ataxic     Psychiatric  Speech:  no latency; no press   Mood & Affect:  euthymic, anxious  congruent and appropriate   Thought Process:  normal and logical   Associations:  intact   Thought Content:  normal, no suicidality, no homicidality, delusions, or paranoia   Insight:  intact   Judgement: behavior is adequate to circumstances   Orientation:  grossly intact   Memory: intact for content of interview   Language: grossly intact   Attention Span & Concentration:  distracted   Fund of Knowledge:  intact and appropriate to age and level of education     Assessment and Diagnosis "   Status/Progress: Based on the examination today, the patient's problem(s) is/are adequately but not ideally controlled.  New problems have not been presented today.   Co-morbidities are not complicating management of the primary condition.  There are no active rule-out diagnoses for this patient at this time.     General Impression:       ICD-10-CM ICD-9-CM   1. Recurrent major depressive disorder, in full remission F33.42 296.36   2. Attention deficit hyperactivity disorder (ADHD), predominantly inattentive type F90.0 314.00   3. AMELIA (generalized anxiety disorder) F41.1 300.02   4. Overweight (BMI 25.0-29.9) E66.3 278.02       Intervention/Counseling/Treatment Plan     Depression  Continue Prozac 80mg QDay  Increase Adipex to 30mg QDay  Continue Trazodone 200mg qhs  AMELIA  Prozac  Stopped Clonazepam previously     Patient endorses diagnosis of OCD  Prozac     ADHD  Adipex    Weight Loss  Adipex     Discussed diagnosis, risks and benefits of proposed treatment vs alternative treatments vs no treatment, and potential side effects of these treatments. The patient expresses understanding of the above and displays the capacity to agree with this treatment given said understanding. Patient also agrees that, currently, the benefits outweigh the risks and would like to pursue treatment at this time.  Checked [place]  and no irregularities were noted.    Return to Clinic: 2 months

## 2019-07-22 ENCOUNTER — OFFICE VISIT (OUTPATIENT)
Dept: PSYCHIATRY | Facility: CLINIC | Age: 34
End: 2019-07-22
Attending: PSYCHIATRY & NEUROLOGY
Payer: COMMERCIAL

## 2019-07-22 VITALS
SYSTOLIC BLOOD PRESSURE: 124 MMHG | WEIGHT: 162.38 LBS | RESPIRATION RATE: 18 BRPM | HEART RATE: 70 BPM | HEIGHT: 62 IN | BODY MASS INDEX: 29.88 KG/M2 | DIASTOLIC BLOOD PRESSURE: 68 MMHG

## 2019-07-22 DIAGNOSIS — F33.42 RECURRENT MAJOR DEPRESSIVE DISORDER, IN FULL REMISSION: Primary | ICD-10-CM

## 2019-07-22 DIAGNOSIS — F90.0 ATTENTION DEFICIT HYPERACTIVITY DISORDER (ADHD), PREDOMINANTLY INATTENTIVE TYPE: ICD-10-CM

## 2019-07-22 DIAGNOSIS — F41.1 GAD (GENERALIZED ANXIETY DISORDER): ICD-10-CM

## 2019-07-22 PROCEDURE — 99999 PR PBB SHADOW E&M-EST. PATIENT-LVL III: ICD-10-PCS | Mod: PBBFAC,,, | Performed by: PSYCHIATRY & NEUROLOGY

## 2019-07-22 PROCEDURE — 3008F BODY MASS INDEX DOCD: CPT | Mod: CPTII,S$GLB,, | Performed by: PSYCHIATRY & NEUROLOGY

## 2019-07-22 PROCEDURE — 99999 PR PBB SHADOW E&M-EST. PATIENT-LVL III: CPT | Mod: PBBFAC,,, | Performed by: PSYCHIATRY & NEUROLOGY

## 2019-07-22 PROCEDURE — 3008F PR BODY MASS INDEX (BMI) DOCUMENTED: ICD-10-PCS | Mod: CPTII,S$GLB,, | Performed by: PSYCHIATRY & NEUROLOGY

## 2019-07-22 PROCEDURE — 99213 OFFICE O/P EST LOW 20 MIN: CPT | Mod: S$GLB,,, | Performed by: PSYCHIATRY & NEUROLOGY

## 2019-07-22 PROCEDURE — 99213 PR OFFICE/OUTPT VISIT, EST, LEVL III, 20-29 MIN: ICD-10-PCS | Mod: S$GLB,,, | Performed by: PSYCHIATRY & NEUROLOGY

## 2019-07-22 RX ORDER — PHENTERMINE HYDROCHLORIDE 30 MG/1
37.5 CAPSULE ORAL EVERY MORNING
Qty: 30 CAPSULE | Refills: 0 | Status: SHIPPED | OUTPATIENT
Start: 2019-07-22 | End: 2019-07-22 | Stop reason: SDUPTHER

## 2019-07-22 RX ORDER — PHENTERMINE HYDROCHLORIDE 37.5 MG/1
37.5 CAPSULE ORAL EVERY MORNING
Qty: 30 CAPSULE | Refills: 0 | Status: SHIPPED | OUTPATIENT
Start: 2019-08-22 | End: 2019-07-22 | Stop reason: SDUPTHER

## 2019-07-22 RX ORDER — TRAZODONE HYDROCHLORIDE 100 MG/1
200 TABLET ORAL NIGHTLY
Qty: 90 TABLET | Refills: 1 | Status: SHIPPED | OUTPATIENT
Start: 2019-07-22

## 2019-07-22 RX ORDER — PHENTERMINE HYDROCHLORIDE 37.5 MG/1
37.5 CAPSULE ORAL EVERY MORNING
Qty: 30 CAPSULE | Refills: 0 | Status: SHIPPED | OUTPATIENT
Start: 2019-07-22 | End: 2019-07-22 | Stop reason: SDUPTHER

## 2019-07-22 RX ORDER — PHENTERMINE HYDROCHLORIDE 37.5 MG/1
37.5 CAPSULE ORAL EVERY MORNING
Qty: 30 CAPSULE | Refills: 0 | Status: SHIPPED | OUTPATIENT
Start: 2019-09-22 | End: 2019-10-22

## 2019-07-22 RX ORDER — FLUOXETINE HYDROCHLORIDE 40 MG/1
80 CAPSULE ORAL DAILY
Qty: 180 CAPSULE | Refills: 1 | Status: SHIPPED | OUTPATIENT
Start: 2019-07-22 | End: 2021-01-12 | Stop reason: SDUPTHER

## 2019-07-22 NOTE — PROGRESS NOTES
"Outpatient Psychiatry Follow-Up Visit (MD/NP)    7/22/2019    Clinical Status of Patient:  Outpatient (Ambulatory)    Chief Complaint:  Johann Hendrix is a 33 y.o. female who presents today for follow-up of depression, anxiety and attention problems.  Met with patient.      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  Her  works as a     12/17/18  She feels like the wellbutrin is not effective.  She feels groggy as if it is not working.  She doesn't have motivation like she used to on phenteramine.  We discussed possibly trying higher dose of wellbutrin or low dose adderall.  She is concerned that stimulants worsen her anxiety.  She is much less anxious without phenteramine.      1/17/19  She does not feel as motivated as when she was on adipex.  She has also been gaining weight, but has not been dieting.  She used to meal prep and her  is dieting.  She will be attempting again after appointment.  Her anxiety is somewhat worsened by adderall but not completely.  She would like to stop clonazepam.      2/14/19  Patient is very anxious and irritable.  She finds that adderall is worse for her than the phentermine despite that dose being much higher.  She finds that she cannot slow down.  She is worried about gaining weight, she has gained 10 pounds since being taken off of phentermine.  She is no longer on clonazepam.      3/12  She overall feels much better.  She does continue to eat " more than she would like.  She explains that her family is usually anxious.  Her attention has worsened.  She is taking care of three children as well as has an [employer] business.  We discussed meditation     5/1  Patient feels like she has no motivation.  She felt much better on phentermine versus wellbutrin.  With wellbutrin she feels brain fog and no energy.  Her anxiety is worsened by switching medicine but depression is well controlled.      5/30  Still feels like her energy is low.  She continues to feel anxious but doesn't attribute the anxiety to her phentermine.  She has gained some weight as well.      7/22  Patient is feeling good, adipex has helped with energy and focus.  No issues with heart, she continues to take hctz.      Denies Symptoms of Depression: diminished mood or loss of interest/anhedonia; irritability, diminished energy, change in sleep, change in appetite, diminished concentration or cognition or indecisiveness, PMA/R, excessive guilt or hopelessness or worthlessness, suicidal ideations     Would have a better mood if wasn't so anxious     Improved Changes in Sleep: less trouble with initiation, maintenance, early morning awakening with inability to return to sleep, hypersomnolence      Denies Suicidal/Homicidal ideations: active/passive ideations, organized plans, future intentions     Possible history of Symptoms of burke or hypomania: elevated, expansive, or irritable mood with increased energy or activity; with inflated self-esteem or grandiosity, decreased need for sleep, increased rate of speech, FOI or racing thoughts, distractibility, increased goal directed activity or PMA, risky/disinhibited behavior     Five years ago she had an episode where, on and off for a year, she would have decreased sleep, racing thoughts, a lot of drama.  She would go a few days without sleep.  Patient explains that she had been initially diagnosed with bipolar disorder, but Dr. Price eventually thought  "that it was OCD and anxiety / depression.  That's why she stopped lithium etc.  She now believes it was a severe depression.      Patient with no OCD symptoms but has impulses related to food    Continued Symptoms of AMELIA: + excessive anxiety/worry/fear, +more days than not, +about numerous issues, +difficult to control, +with restlessness, fatigue, poor concentration, +irritability, +muscle tension, +sleep disturbance; improved causes functionally impairing distress     "I always need to go, all the time"      Improved ADHD Screen, + hyperactivity  Varied of all of the following: Trouble paying close attention to details, or careless mistakes: reports  difficulty sustaining attention/remaining focused: reports  Absent minded/wandeing thoughts during conversation: admits to  Doesn't follow through on instructions, starts tasks but does not finish or easily distracted: does not  Difficulty with organizing: denies  Avoids/dislikes tasks that require sustained attention: denies  Looses important things: admits to  Easily distracted by extraneous stimuli: admits to  Forgetful in daily activities: admits to     No history of a heart problem, she has high blood pressure but is treated with HCTZ.  No family history of heart attack at a young age    Some Symptoms of Social Anxiety Disorder: excessive fear/anxiety regarding social situations with fear of being negatively evaluated, avoidaant behavior, functionally impairing distress    Psychotherapy:  · Target symptoms: anxiety   · Why chosen therapy is appropriate versus another modality: relevant to diagnosis  · Outcome monitoring methods: self-report, observation  · Therapeutic intervention type: behavior modifying psychotherapy, supportive psychotherapy  · Topics discussed/themes: building skills sets for symptom management, symptom recognition  · The patient's response to the intervention is accepting. The patient's progress toward treatment goals is good.   · Duration " "of intervention: 5 minutes.  Discussed mindfulness meditation  Review of Systems   · PSYCHIATRIC: Pertinant items are noted in the narrative.  · CONSTITUTIONAL: weight gain   · MUSCULOSKELETAL: No pain or stiffness of the joints.  · NEUROLOGIC: No weakness, sensory changes, seizures, confusion, memory loss, tremor or other abnormal movements.  · ENDOCRINE: No polydipsia or polyuria.  · RESPIRATORY: No shortness of breath.  · CARDIOVASCULAR: No tachycardia or chest pain.  · GASTROINTESTINAL: No nausea, vomiting, pain, constipation or diarrhea.  · GENITOURINARY: No frequency, dysuria or sexual dysfunction.    Past Medical, Family and Social History: The patient's past medical, family and social history have been reviewed and updated as appropriate within the electronic medical record - see encounter notes.    Compliance: yes    Side effects: None    Risk Parameters:  Patient reports no suicidal ideation  Patient reports no homicidal ideation  Patient reports no self-injurious behavior  Patient reports no violent behavior    Exam (detailed: at least 9 elements; comprehensive: all 15 elements)   Constitutional  Vitals:  Most recent vital signs, dated less than 90 days prior to this appointment, were reviewed.   Vitals:    07/22/19 0946   BP: 124/68   Pulse: 70   Resp: 18   Weight: 73.6 kg (162 lb 5.9 oz)   Height: 5' 2" (1.575 m)        General:  unremarkable, age appropriate     Musculoskeletal  Muscle Strength/Tone:  no spasicity, no rigidity   Gait & Station:  non-ataxic     Psychiatric  Speech:  no latency; no press   Mood & Affect:  euthymic, anxious  congruent and appropriate   Thought Process:  normal and logical   Associations:  intact   Thought Content:  normal, no suicidality, no homicidality, delusions, or paranoia   Insight:  intact   Judgement: behavior is adequate to circumstances   Orientation:  grossly intact   Memory: intact for content of interview   Language: grossly intact   Attention Span & " Concentration:  distracted   Fund of Knowledge:  intact and appropriate to age and level of education     Assessment and Diagnosis   Status/Progress: Based on the examination today, the patient's problem(s) is/are improved and well controlled.  New problems have not been presented today.   Co-morbidities are not complicating management of the primary condition.  There are no active rule-out diagnoses for this patient at this time.     General Impression:       ICD-10-CM ICD-9-CM   1. Recurrent major depressive disorder, in full remission F33.42 296.36   2. Attention deficit hyperactivity disorder (ADHD), predominantly inattentive type F90.0 314.00   3. AMELIA (generalized anxiety disorder) F41.1 300.02       Intervention/Counseling/Treatment Plan     Depression  Continue Prozac 80mg QDay  Increased Adipex to 37.5mg QDay  Continue Trazodone 200mg qhs  AMELIA  Prozac  Stopped Clonazepam previously     Patient endorses diagnosis of OCD  Prozac     ADHD  Adipex    Weight Loss  Adipex     Discussed diagnosis, risks and benefits of proposed treatment vs alternative treatments vs no treatment, and potential side effects of these treatments. The patient expresses understanding of the above and displays the capacity to agree with this treatment given said understanding. Patient also agrees that, currently, the benefits outweigh the risks and would like to pursue treatment at this time.  Checked [place]  and no irregularities were noted.    She plans on going to see Sara Gtz    Return to Clinic: as needed

## 2021-01-12 ENCOUNTER — OFFICE VISIT (OUTPATIENT)
Dept: PSYCHIATRY | Facility: CLINIC | Age: 36
End: 2021-01-12
Payer: COMMERCIAL

## 2021-01-12 VITALS
HEIGHT: 62 IN | HEART RATE: 93 BPM | BODY MASS INDEX: 35.06 KG/M2 | TEMPERATURE: 98 F | WEIGHT: 190.5 LBS | SYSTOLIC BLOOD PRESSURE: 130 MMHG | RESPIRATION RATE: 19 BRPM | DIASTOLIC BLOOD PRESSURE: 85 MMHG

## 2021-01-12 DIAGNOSIS — F33.1 MODERATE EPISODE OF RECURRENT MAJOR DEPRESSIVE DISORDER: Primary | ICD-10-CM

## 2021-01-12 DIAGNOSIS — F41.1 GAD (GENERALIZED ANXIETY DISORDER): ICD-10-CM

## 2021-01-12 DIAGNOSIS — F42.9 OBSESSIVE-COMPULSIVE DISORDER, UNSPECIFIED TYPE: ICD-10-CM

## 2021-01-12 PROCEDURE — 99999 PR PBB SHADOW E&M-EST. PATIENT-LVL III: ICD-10-PCS | Mod: PBBFAC,,, | Performed by: STUDENT IN AN ORGANIZED HEALTH CARE EDUCATION/TRAINING PROGRAM

## 2021-01-12 PROCEDURE — 99999 PR PBB SHADOW E&M-EST. PATIENT-LVL III: CPT | Mod: PBBFAC,,, | Performed by: STUDENT IN AN ORGANIZED HEALTH CARE EDUCATION/TRAINING PROGRAM

## 2021-01-12 PROCEDURE — 90792 PR PSYCHIATRIC DIAGNOSTIC EVALUATION W/MEDICAL SERVICES: ICD-10-PCS | Mod: S$GLB,,, | Performed by: STUDENT IN AN ORGANIZED HEALTH CARE EDUCATION/TRAINING PROGRAM

## 2021-01-12 PROCEDURE — 90792 PSYCH DIAG EVAL W/MED SRVCS: CPT | Mod: S$GLB,,, | Performed by: STUDENT IN AN ORGANIZED HEALTH CARE EDUCATION/TRAINING PROGRAM

## 2021-01-12 RX ORDER — CLOMIPRAMINE HYDROCHLORIDE 25 MG/1
25 CAPSULE ORAL DAILY
COMMUNITY
Start: 2021-01-07

## 2021-01-12 RX ORDER — FLUOXETINE HYDROCHLORIDE 40 MG/1
80 CAPSULE ORAL DAILY
Qty: 60 CAPSULE | Refills: 2 | Status: SHIPPED | OUTPATIENT
Start: 2021-01-12

## 2021-01-13 PROBLEM — F41.1 GAD (GENERALIZED ANXIETY DISORDER): Status: ACTIVE | Noted: 2021-01-13

## 2021-01-13 PROBLEM — F42.9 OCD (OBSESSIVE COMPULSIVE DISORDER): Status: ACTIVE | Noted: 2021-01-13

## 2021-01-13 PROBLEM — F32.9 MDD (MAJOR DEPRESSIVE DISORDER): Status: ACTIVE | Noted: 2021-01-13

## 2021-09-24 ENCOUNTER — OFFICE VISIT (OUTPATIENT)
Dept: URGENT CARE | Facility: CLINIC | Age: 36
End: 2021-09-24
Payer: COMMERCIAL

## 2021-09-24 VITALS
WEIGHT: 190 LBS | TEMPERATURE: 98 F | SYSTOLIC BLOOD PRESSURE: 124 MMHG | RESPIRATION RATE: 18 BRPM | DIASTOLIC BLOOD PRESSURE: 79 MMHG | BODY MASS INDEX: 34.96 KG/M2 | OXYGEN SATURATION: 99 % | HEART RATE: 102 BPM | HEIGHT: 62 IN

## 2021-09-24 DIAGNOSIS — R09.81 SINUS CONGESTION: ICD-10-CM

## 2021-09-24 DIAGNOSIS — U07.1 COVID-19 VIRUS DETECTED: Primary | ICD-10-CM

## 2021-09-24 LAB
CTP QC/QA: YES
SARS-COV-2 RDRP RESP QL NAA+PROBE: POSITIVE

## 2021-09-24 PROCEDURE — 1159F MED LIST DOCD IN RCRD: CPT | Mod: CPTII,S$GLB,, | Performed by: PHYSICIAN ASSISTANT

## 2021-09-24 PROCEDURE — 3008F PR BODY MASS INDEX (BMI) DOCUMENTED: ICD-10-PCS | Mod: CPTII,S$GLB,, | Performed by: PHYSICIAN ASSISTANT

## 2021-09-24 PROCEDURE — 99203 OFFICE O/P NEW LOW 30 MIN: CPT | Mod: S$GLB,,, | Performed by: PHYSICIAN ASSISTANT

## 2021-09-24 PROCEDURE — U0002 COVID-19 LAB TEST NON-CDC: HCPCS | Mod: QW,S$GLB,, | Performed by: PHYSICIAN ASSISTANT

## 2021-09-24 PROCEDURE — 3074F PR MOST RECENT SYSTOLIC BLOOD PRESSURE < 130 MM HG: ICD-10-PCS | Mod: CPTII,S$GLB,, | Performed by: PHYSICIAN ASSISTANT

## 2021-09-24 PROCEDURE — 1159F PR MEDICATION LIST DOCUMENTED IN MEDICAL RECORD: ICD-10-PCS | Mod: CPTII,S$GLB,, | Performed by: PHYSICIAN ASSISTANT

## 2021-09-24 PROCEDURE — 3078F DIAST BP <80 MM HG: CPT | Mod: CPTII,S$GLB,, | Performed by: PHYSICIAN ASSISTANT

## 2021-09-24 PROCEDURE — U0002: ICD-10-PCS | Mod: QW,S$GLB,, | Performed by: PHYSICIAN ASSISTANT

## 2021-09-24 PROCEDURE — 1160F RVW MEDS BY RX/DR IN RCRD: CPT | Mod: CPTII,S$GLB,, | Performed by: PHYSICIAN ASSISTANT

## 2021-09-24 PROCEDURE — 99203 PR OFFICE/OUTPT VISIT, NEW, LEVL III, 30-44 MIN: ICD-10-PCS | Mod: S$GLB,,, | Performed by: PHYSICIAN ASSISTANT

## 2021-09-24 PROCEDURE — 3074F SYST BP LT 130 MM HG: CPT | Mod: CPTII,S$GLB,, | Performed by: PHYSICIAN ASSISTANT

## 2021-09-24 PROCEDURE — 3078F PR MOST RECENT DIASTOLIC BLOOD PRESSURE < 80 MM HG: ICD-10-PCS | Mod: CPTII,S$GLB,, | Performed by: PHYSICIAN ASSISTANT

## 2021-09-24 PROCEDURE — 1160F PR REVIEW ALL MEDS BY PRESCRIBER/CLIN PHARMACIST DOCUMENTED: ICD-10-PCS | Mod: CPTII,S$GLB,, | Performed by: PHYSICIAN ASSISTANT

## 2021-09-24 PROCEDURE — 3008F BODY MASS INDEX DOCD: CPT | Mod: CPTII,S$GLB,, | Performed by: PHYSICIAN ASSISTANT

## 2023-05-09 ENCOUNTER — OFFICE VISIT (OUTPATIENT)
Dept: URGENT CARE | Facility: CLINIC | Age: 38
End: 2023-05-09
Payer: COMMERCIAL

## 2023-05-09 VITALS
HEIGHT: 62 IN | OXYGEN SATURATION: 96 % | RESPIRATION RATE: 19 BRPM | DIASTOLIC BLOOD PRESSURE: 87 MMHG | HEART RATE: 105 BPM | SYSTOLIC BLOOD PRESSURE: 130 MMHG | BODY MASS INDEX: 34.16 KG/M2 | TEMPERATURE: 98 F | WEIGHT: 185.63 LBS

## 2023-05-09 DIAGNOSIS — R05.9 COUGH, UNSPECIFIED TYPE: ICD-10-CM

## 2023-05-09 DIAGNOSIS — J10.1 INFLUENZA B: Primary | ICD-10-CM

## 2023-05-09 LAB
CTP QC/QA: YES
POC MOLECULAR INFLUENZA A AGN: NEGATIVE
POC MOLECULAR INFLUENZA B AGN: POSITIVE

## 2023-05-09 PROCEDURE — 99213 PR OFFICE/OUTPT VISIT, EST, LEVL III, 20-29 MIN: ICD-10-PCS | Mod: S$GLB,,,

## 2023-05-09 PROCEDURE — 87502 POCT INFLUENZA A/B MOLECULAR: ICD-10-PCS | Mod: QW,S$GLB,,

## 2023-05-09 PROCEDURE — 87502 INFLUENZA DNA AMP PROBE: CPT | Mod: QW,S$GLB,,

## 2023-05-09 PROCEDURE — 99213 OFFICE O/P EST LOW 20 MIN: CPT | Mod: S$GLB,,,

## 2023-05-09 RX ORDER — OSELTAMIVIR PHOSPHATE 75 MG/1
75 CAPSULE ORAL 2 TIMES DAILY
Qty: 10 CAPSULE | Refills: 0 | Status: SHIPPED | OUTPATIENT
Start: 2023-05-09 | End: 2023-05-14

## 2023-05-09 RX ORDER — PROMETHAZINE HYDROCHLORIDE AND DEXTROMETHORPHAN HYDROBROMIDE 6.25; 15 MG/5ML; MG/5ML
5 SYRUP ORAL EVERY 4 HOURS PRN
Qty: 118 ML | Refills: 0 | Status: SHIPPED | OUTPATIENT
Start: 2023-05-09 | End: 2023-05-14

## 2023-05-09 RX ORDER — GUAIFENESIN 600 MG/1
1200 TABLET, EXTENDED RELEASE ORAL 2 TIMES DAILY
Qty: 28 TABLET | Refills: 0 | Status: SHIPPED | OUTPATIENT
Start: 2023-05-09 | End: 2023-05-16

## 2023-05-09 NOTE — PROGRESS NOTES
"Subjective:      Patient ID: Johann Hendrix is a 37 y.o. female.    Vitals:  height is 5' 2" (1.575 m) and weight is 84.2 kg (185 lb 10 oz). Her oral temperature is 98.4 °F (36.9 °C). Her blood pressure is 130/87 and her pulse is 105. Her respiration is 19 and oxygen saturation is 96%.     Chief Complaint: Cough    Pt states she has been sick for 3 weeks with a cough, congestion and runny nose. Stated she improved for 2-3 days, but stated 2 days ago symptoms began to start back up again. Exposed to flu last week, her son was diagnosed with Flu B. Pt would like to be tested today.     Cough  This is a new problem. The current episode started 1 to 4 weeks ago. The problem has been unchanged. The cough is Non-productive. Associated symptoms include chills, ear congestion, nasal congestion and rhinorrhea. Pertinent negatives include no ear pain, fever or sore throat. Treatments tried: Mucinex, Sudafed, Dayquil/Nyquil. The treatment provided no relief. There is no history of asthma, bronchitis, COPD or pneumonia.     Constitution: Positive for chills. Negative for fever.        +bodyaches, chills   HENT:  Positive for congestion. Negative for ear pain and sore throat.         +rhinorrhea   Respiratory:  Positive for cough (dry). Negative for asthma.    Gastrointestinal:  Negative for diarrhea.   Allergic/Immunologic: Negative for asthma.    Objective:     Physical Exam   Constitutional: She is oriented to person, place, and time. She appears well-developed. She is cooperative.  Non-toxic appearance. She does not appear ill. No distress.   HENT:   Head: Normocephalic and atraumatic.   Ears:   Right Ear: Hearing, external ear and ear canal normal.   Left Ear: Hearing, external ear and ear canal normal.      Comments: Serous fluid bilaterally.  Nose: Congestion present. No mucosal edema, rhinorrhea or nasal deformity. No epistaxis. Right sinus exhibits no maxillary sinus tenderness and no frontal sinus tenderness. Left " sinus exhibits no maxillary sinus tenderness and no frontal sinus tenderness.   Mouth/Throat: Uvula is midline, oropharynx is clear and moist and mucous membranes are normal. Mucous membranes are moist. No trismus in the jaw. Normal dentition. No uvula swelling. No oropharyngeal exudate, posterior oropharyngeal edema or posterior oropharyngeal erythema.   Eyes: Conjunctivae and lids are normal. No scleral icterus.   Neck: Trachea normal and phonation normal. Neck supple. No edema present. No erythema present. No neck rigidity present.   Cardiovascular: Normal rate, regular rhythm, normal heart sounds and normal pulses.   Pulmonary/Chest: Effort normal and breath sounds normal. No respiratory distress. She has no decreased breath sounds. She has no wheezes. She has no rhonchi.         Comments: Patient is able to talk in complete sentences. No signs of GUZMAN/SOB.     Abdominal: Normal appearance.   Musculoskeletal: Normal range of motion.         General: No deformity. Normal range of motion.   Neurological: She is alert and oriented to person, place, and time. She exhibits normal muscle tone. Coordination normal.   Skin: Skin is warm, dry, intact, not diaphoretic and not pale.   Psychiatric: Her speech is normal and behavior is normal. Judgment and thought content normal.   Nursing note and vitals reviewed.  Results for orders placed or performed in visit on 05/09/23   POCT Influenza A/B MOLECULAR   Result Value Ref Range    POC Molecular Influenza A Ag Negative Negative, Not Reported    POC Molecular Influenza B Ag Positive (A) Negative, Not Reported     Acceptable Yes        Assessment:     1. Influenza B    2. Cough, unspecified type        Plan:       Influenza B  -     oseltamivir (TAMIFLU) 75 MG capsule; Take 1 capsule (75 mg total) by mouth 2 (two) times daily. for 5 days  Dispense: 10 capsule; Refill: 0  -     promethazine-dextromethorphan (PROMETHAZINE-DM) 6.25-15 mg/5 mL Syrp; Take 5 mLs by  mouth every 4 (four) hours as needed (cough).  Dispense: 118 mL; Refill: 0  -     guaiFENesin (MUCINEX) 600 mg 12 hr tablet; Take 2 tablets (1,200 mg total) by mouth 2 (two) times daily. for 7 days  Dispense: 28 tablet; Refill: 0    Cough, unspecified type  -     POCT Influenza A/B MOLECULAR         VS stable.    Declined Covid. Flu B +.   Please drink plenty of fluids. Please get plenty of rest.  Please return here or go to the Emergency Department for any concerns or worsening of condition.  If not allergic, please take over the counter Tylenol (Acetaminophen) and/or Motrin (Ibuprofen) as directed for control of pain and/or fever.  Please follow up with your primary care doctor or specialist as needed.    If you  smoke, please stop smoking.  Discussed with patient the importance of f/u with their primary care provider. Urged to go to the ER for any worsening signs or symptoms.

## 2023-05-09 NOTE — LETTER
May 9, 2023      [facility]  [address], [address]  [place] [address]  Phone: [phone]  Fax: [phone]       Patient: Johann Hendrix   YOB: 1985  Date of Visit: 05/09/2023    To Whom It May Concern:    Ghislaine Hendrix  was at [facility] on 05/09/2023. The patient may return to work/school on 5/13/23 with no restrictions. If you have any questions or concerns, or if I can be of further assistance, please do not hesitate to contact me.    Sincerely,    Ayanna Bedoya PA-C

## 2024-02-12 ENCOUNTER — OFFICE VISIT (OUTPATIENT)
Dept: URGENT CARE | Facility: CLINIC | Age: 39
End: 2024-02-12
Payer: COMMERCIAL

## 2024-02-12 VITALS
HEART RATE: 105 BPM | DIASTOLIC BLOOD PRESSURE: 87 MMHG | SYSTOLIC BLOOD PRESSURE: 121 MMHG | WEIGHT: 185 LBS | TEMPERATURE: 98 F | BODY MASS INDEX: 34.04 KG/M2 | HEIGHT: 62 IN | RESPIRATION RATE: 18 BRPM | OXYGEN SATURATION: 97 %

## 2024-02-12 DIAGNOSIS — Z20.822 EXPOSURE TO COVID-19 VIRUS: ICD-10-CM

## 2024-02-12 DIAGNOSIS — R05.9 COUGH, UNSPECIFIED TYPE: Primary | ICD-10-CM

## 2024-02-12 LAB
CTP QC/QA: YES
POC MOLECULAR INFLUENZA A AGN: NEGATIVE
POC MOLECULAR INFLUENZA B AGN: NEGATIVE

## 2024-02-12 PROCEDURE — 99214 OFFICE O/P EST MOD 30 MIN: CPT | Mod: S$GLB,,, | Performed by: NURSE PRACTITIONER

## 2024-02-12 PROCEDURE — 87502 INFLUENZA DNA AMP PROBE: CPT | Mod: QW,S$GLB,, | Performed by: NURSE PRACTITIONER

## 2024-02-12 NOTE — PROGRESS NOTES
"Subjective:      Patient ID: Johann Hendrix is a 38 y.o. female.    Vitals:  height is 5' 2" (1.575 m) and weight is 83.9 kg (185 lb). Her oral temperature is 98.4 °F (36.9 °C). Her blood pressure is 121/87 and her pulse is 105. Her respiration is 18 and oxygen saturation is 97%.     Chief Complaint: Cough    Cough  This is a new problem. Episode onset: 3 days ago. The problem has been unchanged. The problem occurs every few minutes. The cough is Productive of sputum. Associated symptoms include chills, ear pain, nasal congestion and a sore throat. Pertinent negatives include no fever, headaches or sweats. Associated symptoms comments: Body aches. Treatments tried: mucinex, sudafed. The treatment provided no relief. There is no history of asthma, bronchitis or pneumonia.       Constitution: Positive for chills. Negative for fever.   HENT:  Positive for ear pain and sore throat.    Neck: neck negative.   Cardiovascular: Negative.    Respiratory:  Positive for cough. Negative for sputum production.    Neurological:  Negative for headaches.      Objective:     Physical Exam   Constitutional: She is oriented to person, place, and time. She appears well-developed. She is cooperative.  Non-toxic appearance. She does not appear ill. No distress.   HENT:   Head: Normocephalic and atraumatic.   Ears:   Right Ear: Hearing, tympanic membrane, external ear and ear canal normal.   Left Ear: Hearing, tympanic membrane, external ear and ear canal normal.   Nose: Congestion present. No mucosal edema, rhinorrhea or nasal deformity. No epistaxis. Right sinus exhibits no maxillary sinus tenderness and no frontal sinus tenderness. Left sinus exhibits no maxillary sinus tenderness and no frontal sinus tenderness.   Mouth/Throat: Uvula is midline, oropharynx is clear and moist and mucous membranes are normal. No trismus in the jaw. Normal dentition. No uvula swelling. No oropharyngeal exudate, posterior oropharyngeal edema or posterior " oropharyngeal erythema.   Eyes: Conjunctivae and lids are normal. No scleral icterus.   Neck: Trachea normal and phonation normal. Neck supple. No edema present. No erythema present. No neck rigidity present.   Cardiovascular: Normal rate, regular rhythm, normal heart sounds and normal pulses.   Pulmonary/Chest: Effort normal and breath sounds normal. No stridor. No respiratory distress. She has no decreased breath sounds. She has no wheezes. She has no rhonchi. She has no rales. She exhibits no tenderness.   Abdominal: Normal appearance.   Musculoskeletal: Normal range of motion.         General: No deformity. Normal range of motion.   Neurological: She is alert and oriented to person, place, and time. She exhibits normal muscle tone. Coordination normal.   Skin: Skin is warm, dry, intact, not diaphoretic and not pale.   Psychiatric: Her speech is normal and behavior is normal. Judgment and thought content normal.   Nursing note and vitals reviewed.      Assessment:     1. Cough, unspecified type    2. Exposure to COVID-19 virus        Plan:       Cough, unspecified type  -     POCT Influenza A/B MOLECULAR    Exposure to COVID-19 virus      Results for orders placed or performed in visit on 02/12/24   POCT Influenza A/B MOLECULAR   Result Value Ref Range    POC Molecular Influenza A Ag Negative Negative, Not Reported    POC Molecular Influenza B Ag Negative Negative, Not Reported     Acceptable Yes      .  Patient Instructions   You have been diagnosed with a viral illness. Antibiotics will not help your infection to go away any faster.  You immune system must fight this illness.  You will likely have symptoms for 7-10 days as this is how long a typical virus lasts.  Below are a few things that you can do at home to help yourself feel better in the mean time.     1.  For patients above 6 months of age who are not allergic to and are not on anticoagulants, you can alternate Tylenol and Motrin every  4-6 hours for fever above 100.4F and/or pain.  For patients less than 6 months of age, allergic to or intolerant to NSAIDS, have gastritis, gastric ulcers, or history of GI bleeds, are pregnant, or are on anticoagulant therapy, you can take Tylenol every 4 hours as needed for fever above 100.4F and/or pain.     2.  Rest and keep yourself well hydrated.  Drink hot liquids (coffee, water, tea, hot chocolate, or soup) 10-12 times a day for 5-7 days.  Put liquid in a mug and place in microwave for 2.5 - 3 minutes. Pour hot liquid into another mug not used to microwave the liquid (to avoid burning your mouth) then sniff the steam from the cup and sip the heated liquid.    3.  You can use these over the counter medications/remedies to help with your symptoms:     Runny Nose:  Use an antihistamine such as Claritin, Zyrtec or Allegra to help dry you out.     Congestion:  Use pseudoephedrine (behind the counter) for congestion- Pseudoephedrine 30 mg up to 240 mg /day. Warning:  It can raise your blood pressure and give you palpitations.  Coricidin HBP is okay to use if you have high blood pressure.     Use mucinex (guaifenisin) up to 2400mg/day to break up/loosen any mucous. MucinexDM has a cough suppressant that can be used for cough and at night to stop the tickle in the back of your throat.    Use Nasal Saline to mechanically move any post nasal drip from your eustachian tubes or from the back of your throat.    Use Afrin in each nare, for no longer than 3 days, as it is addictive. It can also dry out your mucous membranes and cause elevated blood pressure.    Use Flonase 1-2 sprays/nostril per day. It is a local acting steroid nasal spray.  If you develop a bloody nose, stop using the medication immediately.    Sore throat:  Use warm, salt water gargles to ease your throat pain- 1/2 tsp salt to 1 cup warm water, gargle as desired.  Chloraseptic sprays and throat lozenges will also help to ease throat pain.     Sometimes  Nyquil at night is beneficial to help you get some rest; however, it is sedating and does contain an antihistamine and Tylenol.  Make sure not to double up on these medications.      These things will help you to feel better and will speed your recovery.  If your condition fails to improve in a timely manner, you should receive another evaluation by your Primary Care Provider/Pediatrician to discuss your concerns or return to urgent care for a recheck.  If your condition worsens at any time, you should report immediately to your nearest Emergency Department for further evaluation. **You must understand that you have received Urgent Care treatment only and that you may be released before all of your medical problems are known or treated. You, the patient, are responsible to arrange for follow-up care as instructed.